# Patient Record
Sex: FEMALE | Race: WHITE | NOT HISPANIC OR LATINO | Employment: UNEMPLOYED | URBAN - METROPOLITAN AREA
[De-identification: names, ages, dates, MRNs, and addresses within clinical notes are randomized per-mention and may not be internally consistent; named-entity substitution may affect disease eponyms.]

---

## 2020-11-20 ENCOUNTER — HOSPITAL ENCOUNTER (EMERGENCY)
Facility: HOSPITAL | Age: 55
Discharge: HOME | End: 2020-11-20
Attending: EMERGENCY MEDICINE
Payer: COMMERCIAL

## 2020-11-20 VITALS
RESPIRATION RATE: 15 BRPM | SYSTOLIC BLOOD PRESSURE: 121 MMHG | TEMPERATURE: 99 F | OXYGEN SATURATION: 92 % | WEIGHT: 293 LBS | HEART RATE: 83 BPM | DIASTOLIC BLOOD PRESSURE: 82 MMHG | BODY MASS INDEX: 56.38 KG/M2

## 2020-11-20 DIAGNOSIS — Z20.822 SUSPECTED COVID-19 VIRUS INFECTION: ICD-10-CM

## 2020-11-20 DIAGNOSIS — J81.0 ACUTE PULMONARY EDEMA: Primary | ICD-10-CM

## 2020-11-20 DIAGNOSIS — R06.02 SOB (SHORTNESS OF BREATH): ICD-10-CM

## 2020-11-20 LAB
ALBUMIN SERPL BCP-MCNC: 3.5 G/DL (ref 3.5–5.2)
ALP SERPL-CCNC: 90 U/L (ref 55–135)
ALT SERPL W/O P-5'-P-CCNC: 32 U/L (ref 10–44)
ANION GAP SERPL CALC-SCNC: 12 MMOL/L (ref 8–16)
AST SERPL-CCNC: 27 U/L (ref 10–40)
BASOPHILS # BLD AUTO: 0.1 K/UL (ref 0–0.2)
BASOPHILS NFR BLD: 0.9 % (ref 0–1.9)
BILIRUB SERPL-MCNC: 0.3 MG/DL (ref 0.1–1)
BILIRUB UR QL STRIP: NEGATIVE
BNP SERPL-MCNC: 34 PG/ML (ref 0–99)
BUN SERPL-MCNC: 7 MG/DL (ref 6–20)
CALCIUM SERPL-MCNC: 9.3 MG/DL (ref 8.7–10.5)
CHLORIDE SERPL-SCNC: 98 MMOL/L (ref 95–110)
CK SERPL-CCNC: 65 U/L (ref 20–180)
CK SERPL-CCNC: 65 U/L (ref 20–180)
CLARITY UR REFRACT.AUTO: CLEAR
CO2 SERPL-SCNC: 27 MMOL/L (ref 23–29)
COLOR UR AUTO: YELLOW
CREAT SERPL-MCNC: 0.7 MG/DL (ref 0.5–1.4)
CRP SERPL-MCNC: 26.5 MG/L (ref 0–8.2)
CTP QC/QA: YES
CTP QC/QA: YES
D DIMER PPP IA.FEU-MCNC: 0.71 MG/L FEU
DIFFERENTIAL METHOD: ABNORMAL
EOSINOPHIL # BLD AUTO: 0.2 K/UL (ref 0–0.5)
EOSINOPHIL NFR BLD: 1.6 % (ref 0–8)
ERYTHROCYTE [DISTWIDTH] IN BLOOD BY AUTOMATED COUNT: 14.9 % (ref 11.5–14.5)
EST. GFR  (AFRICAN AMERICAN): >60 ML/MIN/1.73 M^2
EST. GFR  (NON AFRICAN AMERICAN): >60 ML/MIN/1.73 M^2
GLUCOSE SERPL-MCNC: 104 MG/DL (ref 70–110)
GLUCOSE UR QL STRIP: NEGATIVE
HCT VFR BLD AUTO: 42.5 % (ref 37–48.5)
HGB BLD-MCNC: 13.4 G/DL (ref 12–16)
HGB UR QL STRIP: NEGATIVE
IMM GRANULOCYTES # BLD AUTO: 0.06 K/UL (ref 0–0.04)
IMM GRANULOCYTES NFR BLD AUTO: 0.6 % (ref 0–0.5)
KETONES UR QL STRIP: NEGATIVE
LACTATE SERPL-SCNC: 1 MMOL/L (ref 0.5–2.2)
LDH SERPL L TO P-CCNC: 201 U/L (ref 110–260)
LEUKOCYTE ESTERASE UR QL STRIP: NEGATIVE
LYMPHOCYTES # BLD AUTO: 2.8 K/UL (ref 1–4.8)
LYMPHOCYTES NFR BLD: 26 % (ref 18–48)
MCH RBC QN AUTO: 27.7 PG (ref 27–31)
MCHC RBC AUTO-ENTMCNC: 31.5 G/DL (ref 32–36)
MCV RBC AUTO: 88 FL (ref 82–98)
MONOCYTES # BLD AUTO: 0.7 K/UL (ref 0.3–1)
MONOCYTES NFR BLD: 6.6 % (ref 4–15)
NEUTROPHILS # BLD AUTO: 7 K/UL (ref 1.8–7.7)
NEUTROPHILS NFR BLD: 64.3 % (ref 38–73)
NITRITE UR QL STRIP: NEGATIVE
NRBC BLD-RTO: 0 /100 WBC
PH UR STRIP: 8 [PH] (ref 5–8)
PLATELET # BLD AUTO: 244 K/UL (ref 150–350)
PMV BLD AUTO: 9.9 FL (ref 9.2–12.9)
POTASSIUM SERPL-SCNC: 3.8 MMOL/L (ref 3.5–5.1)
PROT SERPL-MCNC: 7.7 G/DL (ref 6–8.4)
PROT UR QL STRIP: NEGATIVE
RBC # BLD AUTO: 4.84 M/UL (ref 4–5.4)
SARS-COV-2 RDRP RESP QL NAA+PROBE: NEGATIVE
SARS-COV-2 RDRP RESP QL NAA+PROBE: NEGATIVE
SODIUM SERPL-SCNC: 137 MMOL/L (ref 136–145)
SP GR UR STRIP: 1.01 (ref 1–1.03)
TROPONIN I SERPL DL<=0.01 NG/ML-MCNC: 0.02 NG/ML (ref 0–0.03)
URN SPEC COLLECT METH UR: NORMAL
UROBILINOGEN UR STRIP-ACNC: NEGATIVE EU/DL
WBC # BLD AUTO: 10.84 K/UL (ref 3.9–12.7)

## 2020-11-20 PROCEDURE — 96374 THER/PROPH/DIAG INJ IV PUSH: CPT | Mod: 59,ER

## 2020-11-20 PROCEDURE — 84484 ASSAY OF TROPONIN QUANT: CPT | Mod: ER

## 2020-11-20 PROCEDURE — U0002 COVID-19 LAB TEST NON-CDC: HCPCS | Mod: ER | Performed by: EMERGENCY MEDICINE

## 2020-11-20 PROCEDURE — 83615 LACTATE (LD) (LDH) ENZYME: CPT | Mod: ER

## 2020-11-20 PROCEDURE — 85025 COMPLETE CBC W/AUTO DIFF WBC: CPT | Mod: ER

## 2020-11-20 PROCEDURE — 85379 FIBRIN DEGRADATION QUANT: CPT | Mod: ER

## 2020-11-20 PROCEDURE — 82728 ASSAY OF FERRITIN: CPT

## 2020-11-20 PROCEDURE — 93010 ELECTROCARDIOGRAM REPORT: CPT | Mod: ,,, | Performed by: INTERNAL MEDICINE

## 2020-11-20 PROCEDURE — 86703 HIV-1/HIV-2 1 RESULT ANTBDY: CPT

## 2020-11-20 PROCEDURE — 81003 URINALYSIS AUTO W/O SCOPE: CPT | Mod: ER

## 2020-11-20 PROCEDURE — 25500020 PHARM REV CODE 255: Mod: ER | Performed by: EMERGENCY MEDICINE

## 2020-11-20 PROCEDURE — 86140 C-REACTIVE PROTEIN: CPT | Mod: ER

## 2020-11-20 PROCEDURE — 83880 ASSAY OF NATRIURETIC PEPTIDE: CPT | Mod: ER

## 2020-11-20 PROCEDURE — 80053 COMPREHEN METABOLIC PANEL: CPT | Mod: ER

## 2020-11-20 PROCEDURE — 93010 EKG 12-LEAD: ICD-10-PCS | Mod: ,,, | Performed by: INTERNAL MEDICINE

## 2020-11-20 PROCEDURE — 99285 EMERGENCY DEPT VISIT HI MDM: CPT | Mod: 25,ER

## 2020-11-20 PROCEDURE — 86803 HEPATITIS C AB TEST: CPT

## 2020-11-20 PROCEDURE — 83605 ASSAY OF LACTIC ACID: CPT | Mod: ER

## 2020-11-20 PROCEDURE — 93005 ELECTROCARDIOGRAM TRACING: CPT | Mod: ER

## 2020-11-20 PROCEDURE — 87040 BLOOD CULTURE FOR BACTERIA: CPT | Mod: 59

## 2020-11-20 PROCEDURE — 63600175 PHARM REV CODE 636 W HCPCS: Mod: ER | Performed by: NURSE PRACTITIONER

## 2020-11-20 PROCEDURE — 82550 ASSAY OF CK (CPK): CPT | Mod: ER

## 2020-11-20 RX ORDER — FUROSEMIDE 10 MG/ML
20 INJECTION INTRAMUSCULAR; INTRAVENOUS
Status: COMPLETED | OUTPATIENT
Start: 2020-11-20 | End: 2020-11-20

## 2020-11-20 RX ORDER — OXCARBAZEPINE 150 MG/1
150 TABLET, FILM COATED ORAL 2 TIMES DAILY
Status: ON HOLD | COMMUNITY
End: 2021-11-28

## 2020-11-20 RX ORDER — FUROSEMIDE 20 MG/1
20 TABLET ORAL DAILY
Qty: 15 TABLET | Refills: 0 | Status: ON HOLD | OUTPATIENT
Start: 2020-11-20 | End: 2021-11-29 | Stop reason: SDUPTHER

## 2020-11-20 RX ADMIN — FUROSEMIDE 20 MG: 10 INJECTION, SOLUTION INTRAMUSCULAR; INTRAVENOUS at 07:11

## 2020-11-20 RX ADMIN — IOHEXOL 100 ML: 350 INJECTION, SOLUTION INTRAVENOUS at 09:11

## 2020-11-20 NOTE — ED NOTES
"Pt refuses a nasal swab for covid testing. "No, I heard it hurts and I'm not doing it, my grown ass son had to be held down and I'm not doing it."  "

## 2020-11-21 LAB
FERRITIN SERPL-MCNC: 75 NG/ML (ref 20–300)
HCV AB SERPL QL IA: NEGATIVE
HIV 1+2 AB+HIV1 P24 AG SERPL QL IA: NEGATIVE

## 2020-11-21 NOTE — ED NOTES
Patient given copy of discharge instructions and prescriptions. New medications were explained to patient. Patient verbalizes understanding. Stressed the importance of f/u appointment and when to return for worsening symptoms.  No distress noted.

## 2020-11-21 NOTE — ED PROVIDER NOTES
Encounter Date: 11/20/2020       History     Chief Complaint   Patient presents with    Shortness of Breath     Pt was at a pre-op appointment. pt was sent here due to high blood pressure, and o2 sat in the low 90s. pt mildly tachypneic when she walks     The history is provided by the patient.   General Illness   The current episode started just prior to arrival (was being evaluated for surgey and BP was hiigh ,oxugen level low ). The problem occurs rarely (c/o SOB ). The problem has been unchanged. The pain is at a severity of 0/10. Nothing relieves the symptoms. Nothing aggravates the symptoms. Associated symptoms include shortness of breath. Pertinent negatives include no fever, no decreased vision, no double vision, no eye itching, no photophobia, no abdominal pain, no constipation, no diarrhea, no nausea, no vomiting, no congestion, no ear discharge, no ear pain, no headaches, no hearing loss, no mouth sores, no rhinorrhea, no sore throat, no stridor, no swollen glands, no muscle aches, no neck pain, no neck stiffness, no cough, no URI, no wheezing, no rash, no diaper rash, no discharge, no pain and no eye redness. Services received include tests performed. Recently, medical care has been given by the PCP.     Review of patient's allergies indicates:  No Known Allergies  No past medical history on file.  No past surgical history on file.  No family history on file.  Social History     Tobacco Use    Smoking status: Current Every Day Smoker     Packs/day: 1.00   Substance Use Topics    Alcohol use: No    Drug use: No     Review of Systems   Constitutional: Negative for fever.   HENT: Negative for congestion, ear discharge, ear pain, hearing loss, mouth sores, rhinorrhea and sore throat.    Eyes: Negative for double vision, photophobia, pain, discharge, redness and itching.   Respiratory: Positive for shortness of breath. Negative for cough, wheezing and stridor.    Cardiovascular: Negative for chest pain.    Gastrointestinal: Negative for abdominal pain, constipation, diarrhea, nausea and vomiting.   Genitourinary: Negative for dysuria.   Musculoskeletal: Negative for back pain and neck pain.   Skin: Negative for rash.   Neurological: Negative for weakness and headaches.   Hematological: Does not bruise/bleed easily.   All other systems reviewed and are negative.      Physical Exam     Initial Vitals [11/20/20 1618]   BP Pulse Resp Temp SpO2   (!) 192/102 95 (!) 24 98.8 °F (37.1 °C) (!) 90 %      MAP       --         Physical Exam    Nursing note and vitals reviewed.  Constitutional: She appears well-developed and well-nourished.   HENT:   Head: Normocephalic and atraumatic.   Mouth/Throat: No oropharyngeal exudate.   Eyes: Conjunctivae, EOM and lids are normal. Pupils are equal, round, and reactive to light. Lids are everted and swept, no foreign bodies found.   Neck: Trachea normal, normal range of motion, full passive range of motion without pain and phonation normal. Neck supple.   Cardiovascular: Normal rate, regular rhythm, S1 normal, S2 normal, normal heart sounds, intact distal pulses and normal pulses.   Pulmonary/Chest: Effort normal and breath sounds normal. No respiratory distress.   Abdominal: Soft. Bowel sounds are normal.   Lymphadenopathy:     She has no cervical adenopathy.     She has no axillary adenopathy.   Neurological: She is alert and oriented to person, place, and time. She has normal strength and normal reflexes. No cranial nerve deficit or sensory deficit. She displays a negative Romberg sign.   Skin: Skin is warm and dry. Capillary refill takes less than 2 seconds.         ED Course   Procedures  Labs Reviewed   CBC W/ AUTO DIFFERENTIAL - Abnormal; Notable for the following components:       Result Value    MCHC 31.5 (*)     RDW 14.9 (*)     Immature Granulocytes 0.6 (*)     Immature Grans (Abs) 0.06 (*)     All other components within normal limits   C-REACTIVE PROTEIN - Abnormal; Notable  for the following components:    CRP 26.5 (*)     All other components within normal limits   D DIMER, QUANTITATIVE - Abnormal; Notable for the following components:    D-Dimer 0.71 (*)     All other components within normal limits   CULTURE, BLOOD   CULTURE, BLOOD   COMPREHENSIVE METABOLIC PANEL   TROPONIN I   B-TYPE NATRIURETIC PEPTIDE   LACTIC ACID, PLASMA   LACTATE DEHYDROGENASE   CK   CK   URINALYSIS, REFLEX TO URINE CULTURE    Narrative:     Specimen Source->Urine   B-TYPE NATRIURETIC PEPTIDE   HIV 1 / 2 ANTIBODY   HEPATITIS C ANTIBODY   FERRITIN   SARS-COV-2 RDRP GENE   SARS-COV-2 RDRP GENE          Imaging Results          X-Ray Chest 1 View (Final result)  Result time 11/20/20 17:33:51    Final result by Ryan Russo MD (11/20/20 17:33:51)                 Impression:      Findings borderline for mild pulmonary edema.      Electronically signed by: Ryan Russo  Date:    11/20/2020  Time:    17:33             Narrative:    EXAMINATION:  XR CHEST 1 VIEW    CLINICAL HISTORY:  Shortness of breath    TECHNIQUE:  Single frontal view of the chest was performed.    COMPARISON:  None    FINDINGS:  Borderline increased attenuation lung bases possibly relating to pulmonary edema.    The cardiac silhouette is normal in size. The hilar and mediastinal contours are unremarkable.    Bones are intact.                                        9:00 pm care of fifi turned over to Dr. Spencer                     Clinical Impression:       ICD-10-CM ICD-9-CM   1. SOB (shortness of breath)  R06.02 786.05   2. Suspected COVID-19 virus infection  Z20.828 V01.79                                               Dennis Sparks NP  11/22/20 4575

## 2020-11-21 NOTE — DISCHARGE INSTRUCTIONS
Return if  breathing worsens  Follow up with Primary Care Physician Monday to further evaluate your condition

## 2020-11-26 LAB
BACTERIA BLD CULT: NORMAL
BACTERIA BLD CULT: NORMAL

## 2021-01-04 NOTE — ED PROVIDER NOTES
Encounter Date: 11/20/2020       History     Chief Complaint   Patient presents with    Shortness of Breath     Pt was at a pre-op appointment. pt was sent here due to high blood pressure, and o2 sat in the low 90s. pt mildly tachypneic when she walks     HPI  Review of patient's allergies indicates:  No Known Allergies  No past medical history on file.  No past surgical history on file.  No family history on file.  Social History     Tobacco Use    Smoking status: Current Every Day Smoker     Packs/day: 1.00   Substance Use Topics    Alcohol use: No    Drug use: No     Review of Systems    Physical Exam     Initial Vitals [11/20/20 1618]   BP Pulse Resp Temp SpO2   (!) 192/102 95 (!) 24 98.8 °F (37.1 °C) (!) 90 %      MAP       --         Physical Exam    ED Course   Procedures  Labs Reviewed   CBC W/ AUTO DIFFERENTIAL - Abnormal; Notable for the following components:       Result Value    MCHC 31.5 (*)     RDW 14.9 (*)     Immature Granulocytes 0.6 (*)     Immature Grans (Abs) 0.06 (*)     All other components within normal limits   C-REACTIVE PROTEIN - Abnormal; Notable for the following components:    CRP 26.5 (*)     All other components within normal limits   D DIMER, QUANTITATIVE - Abnormal; Notable for the following components:    D-Dimer 0.71 (*)     All other components within normal limits   CULTURE, BLOOD   CULTURE, BLOOD   HIV 1 / 2 ANTIBODY   HEPATITIS C ANTIBODY   COMPREHENSIVE METABOLIC PANEL   TROPONIN I   B-TYPE NATRIURETIC PEPTIDE   LACTIC ACID, PLASMA   FERRITIN   LACTATE DEHYDROGENASE   CK   CK   URINALYSIS, REFLEX TO URINE CULTURE    Narrative:     Specimen Source->Urine   B-TYPE NATRIURETIC PEPTIDE   SARS-COV-2 RDRP GENE   SARS-COV-2 RDRP GENE        ECG Results          EKG 12-lead (Final result)  Result time 11/22/20 19:42:25    Final result by Interface, Lab [facility] (11/22/20 19:42:25)                 Narrative:    Test Reason : Z20.828,    Vent. Rate : 103 BPM     Atrial Rate : 103  BPM     P-R Int : 128 ms          QRS Dur : 090 ms      QT Int : 342 ms       P-R-T Axes : 063 069 019 degrees     QTc Int : 448 ms    Sinus tachycardia  Otherwise normal ECG  No previous ECGs available  Confirmed by ALBERT KAMINSKI MD ([id number]) on 11/22/2020 7:41:54 PM    Referred By: [email or web address]   SELF           Confirmed By:ALBERT KAMINSKI MD                             EKG 12-LEAD (Final result)  Result time 12/29/20 11:18:38    Final result by Unknown User (12/29/20 11:18:38)                                Imaging Results          CTA Chest Non-Coronary (PE Study) (Final result)  Result time 11/20/20 21:27:04    Final result by Ryan Russo MD (11/20/20 21:27:04)                 Impression:      No pulmonary thromboembolism.    Slight increased interstitial attenuation in the dependent lung bases, nonspecific.  A component of mild pulmonary volume overload cannot be excluded.    All CT scans at this facility are performed  using dose modulation techniques as appropriate to performed exam including the following:  automated exposure control; adjustment of mA and/or kV according to the patients size (this includes techniques or standardized protocols for targeted exams where dose is matched to indication/reason for exam: i.e. extremities or head);  iterative reconstruction technique.      Electronically signed by: Ryan Russo  Date:    11/20/2020  Time:    21:27             Narrative:    EXAMINATION:  CTA CHEST NON CORONARY    CLINICAL HISTORY:  PE suspected, intermediate prob, positive D-dimer;    TECHNIQUE:  Low dose axial images, sagittal and coronal reformations were obtained from the thoracic inlet to the lung bases following the IV administration of 100 mL of Omnipaque 350.  Contrast timing was optimized to evaluate the pulmonary arteries.  MIP images were performed.    COMPARISON:  Chest radiograph 11/20/2020.    FINDINGS:  Base of Neck: No significant abnormality.    Thoracic soft tissues:  Unremarkable.    Aorta: Left-sided aortic arch.  No aneurysm and no significant atherosclerosis    Heart: Normal size. No effusion.    Pulmonary vasculature: Pulmonary arteries are well opacified.  There is no pulmonary thromboembolism.    Neda/Mediastinum: No pathologic leandra enlargement.    Airways: Patent.    Lungs/Pleura: Slight increased interstitial attenuation in the dependent lung bases, nonspecific.  A component of mild pulmonary volume overload cannot be excluded.  Otherwise the lungs are clear.    Esophagus: Unremarkable.    Upper Abdomen: Cholelithiasis.    Bones: No acute fracture. No suspicious lytic or sclerotic lesions.                               X-Ray Chest 1 View (Final result)  Result time 11/20/20 17:33:51    Final result by Ryan Russo MD (11/20/20 17:33:51)                 Impression:      Findings borderline for mild pulmonary edema.      Electronically signed by: Ryan Russo  Date:    11/20/2020  Time:    17:33             Narrative:    EXAMINATION:  XR CHEST 1 VIEW    CLINICAL HISTORY:  Shortness of breath    TECHNIQUE:  Single frontal view of the chest was performed.    COMPARISON:  None    FINDINGS:  Borderline increased attenuation lung bases possibly relating to pulmonary edema.    The cardiac silhouette is normal in size. The hilar and mediastinal contours are unremarkable.    Bones are intact.                                              Attending Attestation:     Physician Attestation Statement for NP/PA:   I have conducted a face to face encounter with this patient in addition to the NP/PA, due to NP/PA Request    Other NP/PA Attestation Additions:    History of Present Illness: 56 y/o female with c/o SOB with activity. Was undergoing evaluation for surgery and noted to be tachypneic and sent to ED. Sats were also low.   Physical Exam: Pt in minimal distress with normal RR at time of my exam. VSS. Afebrile  No use of accessory respiratory muscles.    Lungs: Scattered  rhonchi with diminished basilar BS  Heart : RRR   Extremities with no cyanosis     Medical Decision Making: Patient turned over essentially gto review CT scan as PE was being ruled out.  The CT revealed no evidence of PE and patient was stable enough for discharge secondary to COPD and P. Edema that is currently stable   Procedure Note: 0                          Clinical Impression:       ICD-10-CM ICD-9-CM   1. Acute pulmonary edema  J81.0 518.4   2. SOB (shortness of breath)  R06.02 786.05   3. Suspected COVID-19 virus infection  Z20.828 V01.79                          ED Disposition Condition    Discharge Stable        ED Prescriptions     Medication Sig Dispense Start Date End Date Auth. Provider    furosemide (LASIX) 20 MG tablet Take 1 tablet (20 mg total) by mouth once daily. For fluid 15 tablet 11/20/2020  Simone Spencer MD        Follow-up Information     Follow up With Specialties Details Why Contact Info    [facility] Emergency Medicine  If symptoms worsen [address]  [facility] [id number] [phone]                                       Simone Spencer MD  01/04/21 0816

## 2021-11-27 ENCOUNTER — HOSPITAL ENCOUNTER (INPATIENT)
Facility: HOSPITAL | Age: 56
LOS: 2 days | Discharge: HOME HEALTH | DRG: 189 | End: 2021-11-29
Attending: EMERGENCY MEDICINE | Admitting: INTERNAL MEDICINE
Payer: COMMERCIAL

## 2021-11-27 DIAGNOSIS — J96.21 ACUTE ON CHRONIC RESPIRATORY FAILURE WITH HYPOXIA AND HYPERCAPNIA: ICD-10-CM

## 2021-11-27 DIAGNOSIS — E66.01 CLASS 3 SEVERE OBESITY DUE TO EXCESS CALORIES WITHOUT SERIOUS COMORBIDITY WITH BODY MASS INDEX (BMI) OF 60.0 TO 69.9 IN ADULT: Chronic | ICD-10-CM

## 2021-11-27 DIAGNOSIS — J96.01 ACUTE RESPIRATORY FAILURE WITH HYPOXIA AND HYPERCAPNIA: Primary | ICD-10-CM

## 2021-11-27 DIAGNOSIS — J96.22 ACUTE ON CHRONIC RESPIRATORY FAILURE WITH HYPOXIA AND HYPERCAPNIA: ICD-10-CM

## 2021-11-27 DIAGNOSIS — R06.02 SOB (SHORTNESS OF BREATH): ICD-10-CM

## 2021-11-27 DIAGNOSIS — J96.02 ACUTE RESPIRATORY FAILURE WITH HYPOXIA AND HYPERCAPNIA: Primary | ICD-10-CM

## 2021-11-27 LAB
ALBUMIN SERPL BCP-MCNC: 3.5 G/DL (ref 3.5–5.2)
ALLENS TEST: ABNORMAL
ALP SERPL-CCNC: 69 U/L (ref 55–135)
ALT SERPL W/O P-5'-P-CCNC: 48 U/L (ref 10–44)
ANION GAP SERPL CALC-SCNC: 7 MMOL/L (ref 8–16)
AST SERPL-CCNC: 28 U/L (ref 10–40)
BASOPHILS # BLD AUTO: 0.06 K/UL (ref 0–0.2)
BASOPHILS NFR BLD: 0.9 % (ref 0–1.9)
BILIRUB SERPL-MCNC: 0.2 MG/DL (ref 0.1–1)
BNP SERPL-MCNC: 136 PG/ML (ref 0–99)
BUN SERPL-MCNC: 10 MG/DL (ref 6–20)
CALCIUM SERPL-MCNC: 9 MG/DL (ref 8.7–10.5)
CHLORIDE SERPL-SCNC: 101 MMOL/L (ref 95–110)
CK SERPL-CCNC: 169 U/L (ref 20–180)
CO2 SERPL-SCNC: 33 MMOL/L (ref 23–29)
CREAT SERPL-MCNC: 0.8 MG/DL (ref 0.5–1.4)
CTP QC/QA: YES
CTP QC/QA: YES
DELSYS: ABNORMAL
DIFFERENTIAL METHOD: ABNORMAL
EOSINOPHIL # BLD AUTO: 0.1 K/UL (ref 0–0.5)
EOSINOPHIL NFR BLD: 0.8 % (ref 0–8)
ERYTHROCYTE [DISTWIDTH] IN BLOOD BY AUTOMATED COUNT: 15.9 % (ref 11.5–14.5)
EST. GFR  (AFRICAN AMERICAN): >60 ML/MIN/1.73 M^2
EST. GFR  (NON AFRICAN AMERICAN): >60 ML/MIN/1.73 M^2
FIO2: 28
GLUCOSE SERPL-MCNC: 139 MG/DL (ref 70–110)
HCO3 UR-SCNC: 37.3 MMOL/L (ref 24–28)
HCT VFR BLD AUTO: 39.2 % (ref 37–48.5)
HGB BLD-MCNC: 11.4 G/DL (ref 12–16)
IMM GRANULOCYTES # BLD AUTO: 0.09 K/UL (ref 0–0.04)
IMM GRANULOCYTES NFR BLD AUTO: 1.4 % (ref 0–0.5)
LYMPHOCYTES # BLD AUTO: 1.5 K/UL (ref 1–4.8)
LYMPHOCYTES NFR BLD: 22.2 % (ref 18–48)
MCH RBC QN AUTO: 27.1 PG (ref 27–31)
MCHC RBC AUTO-ENTMCNC: 29.1 G/DL (ref 32–36)
MCV RBC AUTO: 93 FL (ref 82–98)
MODE: ABNORMAL
MONOCYTES # BLD AUTO: 0.6 K/UL (ref 0.3–1)
MONOCYTES NFR BLD: 8.5 % (ref 4–15)
NEUTROPHILS # BLD AUTO: 4.4 K/UL (ref 1.8–7.7)
NEUTROPHILS NFR BLD: 66.2 % (ref 38–73)
NRBC BLD-RTO: 0 /100 WBC
PCO2 BLDA: 85.3 MMHG (ref 35–45)
PH SMN: 7.25 [PH] (ref 7.35–7.45)
PLATELET # BLD AUTO: 183 K/UL (ref 150–450)
PMV BLD AUTO: 10.2 FL (ref 9.2–12.9)
PO2 BLDA: 51 MMHG (ref 80–100)
POC BE: 10 MMOL/L
POC MOLECULAR INFLUENZA A AGN: NEGATIVE
POC MOLECULAR INFLUENZA B AGN: NEGATIVE
POC SATURATED O2: 77 % (ref 95–100)
POTASSIUM SERPL-SCNC: 4.4 MMOL/L (ref 3.5–5.1)
PROT SERPL-MCNC: 7.4 G/DL (ref 6–8.4)
RBC # BLD AUTO: 4.2 M/UL (ref 4–5.4)
SAMPLE: ABNORMAL
SARS-COV-2 RDRP RESP QL NAA+PROBE: NEGATIVE
SITE: ABNORMAL
SODIUM SERPL-SCNC: 141 MMOL/L (ref 136–145)
TROPONIN I SERPL DL<=0.01 NG/ML-MCNC: <0.006 NG/ML (ref 0–0.03)
WBC # BLD AUTO: 6.58 K/UL (ref 3.9–12.7)

## 2021-11-27 PROCEDURE — A4216 STERILE WATER/SALINE, 10 ML: HCPCS | Performed by: INTERNAL MEDICINE

## 2021-11-27 PROCEDURE — 94660 CPAP INITIATION&MGMT: CPT | Mod: ER

## 2021-11-27 PROCEDURE — 99900035 HC TECH TIME PER 15 MIN (STAT): Mod: ER

## 2021-11-27 PROCEDURE — 36600 WITHDRAWAL OF ARTERIAL BLOOD: CPT | Mod: ER

## 2021-11-27 PROCEDURE — 27000190 HC CPAP FULL FACE MASK W/VALVE: Mod: ER

## 2021-11-27 PROCEDURE — 82803 BLOOD GASES ANY COMBINATION: CPT | Mod: ER

## 2021-11-27 PROCEDURE — 63600175 PHARM REV CODE 636 W HCPCS: Performed by: INTERNAL MEDICINE

## 2021-11-27 PROCEDURE — 20000000 HC ICU ROOM

## 2021-11-27 PROCEDURE — 93010 EKG 12-LEAD: ICD-10-PCS | Mod: ,,, | Performed by: INTERNAL MEDICINE

## 2021-11-27 PROCEDURE — 80053 COMPREHEN METABOLIC PANEL: CPT | Mod: ER | Performed by: EMERGENCY MEDICINE

## 2021-11-27 PROCEDURE — 82550 ASSAY OF CK (CPK): CPT | Mod: ER | Performed by: EMERGENCY MEDICINE

## 2021-11-27 PROCEDURE — 93005 ELECTROCARDIOGRAM TRACING: CPT | Mod: ER

## 2021-11-27 PROCEDURE — 85025 COMPLETE CBC W/AUTO DIFF WBC: CPT | Mod: ER | Performed by: EMERGENCY MEDICINE

## 2021-11-27 PROCEDURE — 93010 ELECTROCARDIOGRAM REPORT: CPT | Mod: ,,, | Performed by: INTERNAL MEDICINE

## 2021-11-27 PROCEDURE — 83880 ASSAY OF NATRIURETIC PEPTIDE: CPT | Mod: ER | Performed by: EMERGENCY MEDICINE

## 2021-11-27 PROCEDURE — U0002 COVID-19 LAB TEST NON-CDC: HCPCS | Mod: ER | Performed by: EMERGENCY MEDICINE

## 2021-11-27 PROCEDURE — 84484 ASSAY OF TROPONIN QUANT: CPT | Mod: ER | Performed by: EMERGENCY MEDICINE

## 2021-11-27 PROCEDURE — 25000003 PHARM REV CODE 250: Performed by: INTERNAL MEDICINE

## 2021-11-27 PROCEDURE — 99285 EMERGENCY DEPT VISIT HI MDM: CPT | Mod: 25,ER

## 2021-11-27 PROCEDURE — 99900035 HC TECH TIME PER 15 MIN (STAT)

## 2021-11-27 RX ORDER — LANOLIN ALCOHOL/MO/W.PET/CERES
800 CREAM (GRAM) TOPICAL
Status: DISCONTINUED | OUTPATIENT
Start: 2021-11-27 | End: 2021-11-29 | Stop reason: HOSPADM

## 2021-11-27 RX ORDER — NALOXONE HCL 0.4 MG/ML
0.02 VIAL (ML) INJECTION
Status: DISCONTINUED | OUTPATIENT
Start: 2021-11-27 | End: 2021-11-29 | Stop reason: HOSPADM

## 2021-11-27 RX ORDER — SODIUM CHLORIDE 0.9 % (FLUSH) 0.9 %
10 SYRINGE (ML) INJECTION EVERY 8 HOURS
Status: DISCONTINUED | OUTPATIENT
Start: 2021-11-27 | End: 2021-11-29 | Stop reason: HOSPADM

## 2021-11-27 RX ORDER — GLUCAGON 1 MG
1 KIT INJECTION
Status: DISCONTINUED | OUTPATIENT
Start: 2021-11-27 | End: 2021-11-29 | Stop reason: HOSPADM

## 2021-11-27 RX ORDER — METHYLPREDNISOLONE SOD SUCC 125 MG
80 VIAL (EA) INJECTION EVERY 6 HOURS
Status: DISCONTINUED | OUTPATIENT
Start: 2021-11-28 | End: 2021-11-28

## 2021-11-27 RX ORDER — SODIUM,POTASSIUM PHOSPHATES 280-250MG
2 POWDER IN PACKET (EA) ORAL
Status: DISCONTINUED | OUTPATIENT
Start: 2021-11-27 | End: 2021-11-29 | Stop reason: HOSPADM

## 2021-11-27 RX ORDER — BUDESONIDE 0.5 MG/2ML
0.5 INHALANT ORAL EVERY 12 HOURS
Status: DISCONTINUED | OUTPATIENT
Start: 2021-11-28 | End: 2021-11-29 | Stop reason: HOSPADM

## 2021-11-27 RX ORDER — LOSARTAN POTASSIUM 50 MG/1
50 TABLET ORAL
COMMUNITY
Start: 2021-04-27

## 2021-11-27 RX ORDER — TALC
6 POWDER (GRAM) TOPICAL NIGHTLY PRN
Status: DISCONTINUED | OUTPATIENT
Start: 2021-11-27 | End: 2021-11-29 | Stop reason: HOSPADM

## 2021-11-27 RX ORDER — ACETAMINOPHEN 325 MG/1
650 TABLET ORAL EVERY 4 HOURS PRN
Status: DISCONTINUED | OUTPATIENT
Start: 2021-11-27 | End: 2021-11-29 | Stop reason: HOSPADM

## 2021-11-27 RX ORDER — LOSARTAN POTASSIUM 50 MG/1
50 TABLET ORAL DAILY
Status: DISCONTINUED | OUTPATIENT
Start: 2021-11-28 | End: 2021-11-29 | Stop reason: HOSPADM

## 2021-11-27 RX ORDER — HYDROCODONE BITARTRATE AND ACETAMINOPHEN 5; 325 MG/1; MG/1
1 TABLET ORAL EVERY 6 HOURS PRN
Status: DISCONTINUED | OUTPATIENT
Start: 2021-11-27 | End: 2021-11-29 | Stop reason: HOSPADM

## 2021-11-27 RX ORDER — IBUPROFEN 200 MG
16 TABLET ORAL
Status: DISCONTINUED | OUTPATIENT
Start: 2021-11-27 | End: 2021-11-29 | Stop reason: HOSPADM

## 2021-11-27 RX ORDER — FUROSEMIDE 20 MG/1
20 TABLET ORAL DAILY
Status: DISCONTINUED | OUTPATIENT
Start: 2021-11-28 | End: 2021-11-28

## 2021-11-27 RX ORDER — POLYETHYLENE GLYCOL 3350 17 G/17G
17 POWDER, FOR SOLUTION ORAL DAILY PRN
Status: DISCONTINUED | OUTPATIENT
Start: 2021-11-27 | End: 2021-11-29 | Stop reason: HOSPADM

## 2021-11-27 RX ORDER — ATORVASTATIN CALCIUM 40 MG/1
TABLET, FILM COATED ORAL
COMMUNITY
Start: 2021-05-02 | End: 2023-09-22

## 2021-11-27 RX ORDER — LOSARTAN POTASSIUM 25 MG/1
25 TABLET ORAL
COMMUNITY
Start: 2021-04-09 | End: 2021-11-27 | Stop reason: SDUPTHER

## 2021-11-27 RX ORDER — OXCARBAZEPINE 150 MG/1
150 TABLET, FILM COATED ORAL 2 TIMES DAILY
Status: DISCONTINUED | OUTPATIENT
Start: 2021-11-27 | End: 2021-11-28

## 2021-11-27 RX ORDER — IBUPROFEN 200 MG
24 TABLET ORAL
Status: DISCONTINUED | OUTPATIENT
Start: 2021-11-27 | End: 2021-11-29 | Stop reason: HOSPADM

## 2021-11-27 RX ORDER — CLONAZEPAM 1 MG/1
1 TABLET ORAL
COMMUNITY
Start: 2021-05-25

## 2021-11-27 RX ORDER — IPRATROPIUM BROMIDE AND ALBUTEROL SULFATE 2.5; .5 MG/3ML; MG/3ML
3 SOLUTION RESPIRATORY (INHALATION) EVERY 6 HOURS
Status: DISCONTINUED | OUTPATIENT
Start: 2021-11-28 | End: 2021-11-28

## 2021-11-27 RX ORDER — ARFORMOTEROL TARTRATE 15 UG/2ML
15 SOLUTION RESPIRATORY (INHALATION) 2 TIMES DAILY
Status: DISCONTINUED | OUTPATIENT
Start: 2021-11-28 | End: 2021-11-29 | Stop reason: HOSPADM

## 2021-11-27 RX ADMIN — AZITHROMYCIN MONOHYDRATE 500 MG: 500 INJECTION, POWDER, LYOPHILIZED, FOR SOLUTION INTRAVENOUS at 11:11

## 2021-11-27 RX ADMIN — Medication 10 ML: at 10:11

## 2021-11-27 RX ADMIN — METHYLPREDNISOLONE SODIUM SUCCINATE 80 MG: 125 INJECTION, POWDER, FOR SOLUTION INTRAMUSCULAR; INTRAVENOUS at 11:11

## 2021-11-27 RX ADMIN — APIXABAN 5 MG: 2.5 TABLET, FILM COATED ORAL at 11:11

## 2021-11-27 RX ADMIN — OXCARBAZEPINE 150 MG: 150 TABLET, FILM COATED ORAL at 11:11

## 2021-11-28 PROBLEM — I10 ESSENTIAL HYPERTENSION, BENIGN: Chronic | Status: ACTIVE | Noted: 2021-11-28

## 2021-11-28 PROBLEM — Z87.891 SMOKING HISTORY: Status: ACTIVE | Noted: 2021-11-28

## 2021-11-28 PROBLEM — G62.9 NEUROPATHY: Status: ACTIVE | Noted: 2021-11-28

## 2021-11-28 PROBLEM — E66.813 CLASS 3 SEVERE OBESITY DUE TO EXCESS CALORIES WITHOUT SERIOUS COMORBIDITY WITH BODY MASS INDEX (BMI) OF 60.0 TO 69.9 IN ADULT: Chronic | Status: ACTIVE | Noted: 2021-11-28

## 2021-11-28 PROBLEM — I87.2 VENOUS INSUFFICIENCY OF BOTH LOWER EXTREMITIES: Status: ACTIVE | Noted: 2021-11-28

## 2021-11-28 PROBLEM — J96.21 ACUTE ON CHRONIC RESPIRATORY FAILURE WITH HYPOXIA AND HYPERCAPNIA: Status: ACTIVE | Noted: 2021-11-28

## 2021-11-28 PROBLEM — J96.22 ACUTE ON CHRONIC RESPIRATORY FAILURE WITH HYPOXIA AND HYPERCAPNIA: Status: ACTIVE | Noted: 2021-11-28

## 2021-11-28 PROBLEM — G47.33 OSA (OBSTRUCTIVE SLEEP APNEA): Chronic | Status: ACTIVE | Noted: 2021-11-28

## 2021-11-28 PROBLEM — E66.01 CLASS 3 SEVERE OBESITY DUE TO EXCESS CALORIES WITHOUT SERIOUS COMORBIDITY WITH BODY MASS INDEX (BMI) OF 60.0 TO 69.9 IN ADULT: Chronic | Status: ACTIVE | Noted: 2021-11-28

## 2021-11-28 PROBLEM — Z82.49 FAMILY HISTORY OF ABDOMINAL AORTIC ANEURYSM (AAA): Status: ACTIVE | Noted: 2021-06-10

## 2021-11-28 PROBLEM — I26.94 MULTIPLE SUBSEGMENTAL PULMONARY EMBOLI WITHOUT ACUTE COR PULMONALE: Chronic | Status: ACTIVE | Noted: 2021-06-10

## 2021-11-28 PROBLEM — Z87.891 SMOKING HISTORY: Chronic | Status: ACTIVE | Noted: 2021-11-28

## 2021-11-28 PROBLEM — E66.01 SEVERE OBESITY (BMI >= 40): Status: ACTIVE | Noted: 2021-11-28

## 2021-11-28 PROBLEM — I26.94 MULTIPLE SUBSEGMENTAL PULMONARY EMBOLI WITHOUT ACUTE COR PULMONALE: Status: ACTIVE | Noted: 2021-06-10

## 2021-11-28 PROBLEM — I10 ESSENTIAL HYPERTENSION, BENIGN: Status: ACTIVE | Noted: 2021-11-28

## 2021-11-28 LAB
ALLENS TEST: ABNORMAL
AORTIC ROOT ANNULUS: 2.7 CM
AV INDEX (PROSTH): 0.79
AV MEAN GRADIENT: 6 MMHG
AV PEAK GRADIENT: 11 MMHG
AV VALVE AREA: 2.91 CM2
AV VELOCITY RATIO: 0.7
BSA FOR ECHO PROCEDURE: 2.82 M2
CV ECHO LV RWT: 0.4 CM
DELSYS: ABNORMAL
DOP CALC AO PEAK VEL: 1.68 M/S
DOP CALC AO VTI: 31.6 CM
DOP CALC LVOT AREA: 3.7 CM2
DOP CALC LVOT DIAMETER: 2.17 CM
DOP CALC LVOT PEAK VEL: 1.18 M/S
DOP CALC LVOT STROKE VOLUME: 92.04 CM3
DOP CALC MV VTI: 28.8 CM
DOP CALCLVOT PEAK VEL VTI: 24.9 CM
E WAVE DECELERATION TIME: 271.26 MSEC
E/A RATIO: 1.34
E/E' RATIO: 10 M/S
ECHO EF ESTIMATED: 52 %
ECHO LV POSTERIOR WALL: 1.1 CM (ref 0.6–1.1)
EJECTION FRACTION: 60 %
EP: 9
ERYTHROCYTE [SEDIMENTATION RATE] IN BLOOD BY WESTERGREN METHOD: 26 MM/H
ERYTHROCYTE [SEDIMENTATION RATE] IN BLOOD BY WESTERGREN METHOD: 34 MM/H
ERYTHROCYTE [SEDIMENTATION RATE] IN BLOOD BY WESTERGREN METHOD: 34 MM/H
FIO2: 35
FIO2: 35
FIO2: 50
FRACTIONAL SHORTENING: 27 % (ref 28–44)
HCO3 UR-SCNC: 37.3 MMOL/L (ref 24–28)
HCO3 UR-SCNC: 37.3 MMOL/L (ref 24–28)
HCO3 UR-SCNC: 40.6 MMOL/L (ref 24–28)
INTERVENTRICULAR SEPTUM: 1.27 CM (ref 0.6–1.1)
IP: 18
IP: 22
IP: 22
LA MAJOR: 6.08 CM
LA WIDTH: 4.93 CM
LEFT ATRIUM SIZE: 4.01 CM
LEFT INTERNAL DIMENSION IN SYSTOLE: 4 CM (ref 2.1–4)
LEFT VENTRICLE DIASTOLIC VOLUME INDEX: 56.49 ML/M2
LEFT VENTRICLE DIASTOLIC VOLUME: 146.31 ML
LEFT VENTRICLE MASS INDEX: 103 G/M2
LEFT VENTRICLE SYSTOLIC VOLUME INDEX: 27.1 ML/M2
LEFT VENTRICLE SYSTOLIC VOLUME: 70.19 ML
LEFT VENTRICULAR INTERNAL DIMENSION IN DIASTOLE: 5.48 CM (ref 3.5–6)
LEFT VENTRICULAR MASS: 266.15 G
LV LATERAL E/E' RATIO: 9.58 M/S
LV SEPTAL E/E' RATIO: 10.45 M/S
LVOT MG: 2.63 MMHG
LVOT MV: 0.77 CM/S
MODE: ABNORMAL
MV MEAN GRADIENT: 5 MMHG
MV PEAK A VEL: 0.86 M/S
MV PEAK E VEL: 1.15 M/S
MV PEAK GRADIENT: 7 MMHG
MV STENOSIS PRESSURE HALF TIME: 78.67 MS
MV VALVE AREA BY CONTINUITY EQUATION: 3.2 CM2
MV VALVE AREA P 1/2 METHOD: 2.8 CM2
PCO2 BLDA: 81 MMHG (ref 35–45)
PCO2 BLDA: 88.4 MMHG (ref 35–45)
PCO2 BLDA: 88.6 MMHG (ref 35–45)
PH SMN: 7.23 [PH] (ref 7.35–7.45)
PH SMN: 7.27 [PH] (ref 7.35–7.45)
PH SMN: 7.27 [PH] (ref 7.35–7.45)
PISA MRMAX VEL: 5.48 M/S
PISA TR MAX VEL: 3.31 M/S
PO2 BLDA: 64 MMHG (ref 80–100)
PO2 BLDA: 74 MMHG (ref 80–100)
PO2 BLDA: 80 MMHG (ref 80–100)
POC BE: 10 MMOL/L
POC BE: 10 MMOL/L
POC BE: 14 MMOL/L
POC SATURATED O2: 87 % (ref 95–100)
POC SATURATED O2: 91 % (ref 95–100)
POC SATURATED O2: 92 % (ref 95–100)
PV PEAK VELOCITY: 1.32 CM/S
RA MAJOR: 4.74 CM
RA WIDTH: 3.79 CM
RIGHT VENTRICULAR END-DIASTOLIC DIMENSION: 3.19 CM
SAMPLE: ABNORMAL
SITE: ABNORMAL
TDI LATERAL: 0.12 M/S
TDI SEPTAL: 0.11 M/S
TDI: 0.12 M/S
TR MAX PG: 44 MMHG

## 2021-11-28 PROCEDURE — 99291 PR CRITICAL CARE, E/M 30-74 MINUTES: ICD-10-PCS | Mod: ,,, | Performed by: NURSE PRACTITIONER

## 2021-11-28 PROCEDURE — 99291 CRITICAL CARE FIRST HOUR: CPT | Mod: ,,, | Performed by: NURSE PRACTITIONER

## 2021-11-28 PROCEDURE — 25000242 PHARM REV CODE 250 ALT 637 W/ HCPCS: Performed by: NURSE PRACTITIONER

## 2021-11-28 PROCEDURE — 94761 N-INVAS EAR/PLS OXIMETRY MLT: CPT

## 2021-11-28 PROCEDURE — 25000003 PHARM REV CODE 250: Performed by: NURSE PRACTITIONER

## 2021-11-28 PROCEDURE — 94640 AIRWAY INHALATION TREATMENT: CPT

## 2021-11-28 PROCEDURE — 20000000 HC ICU ROOM

## 2021-11-28 PROCEDURE — 25000003 PHARM REV CODE 250: Performed by: INTERNAL MEDICINE

## 2021-11-28 PROCEDURE — 94660 CPAP INITIATION&MGMT: CPT

## 2021-11-28 PROCEDURE — 82803 BLOOD GASES ANY COMBINATION: CPT

## 2021-11-28 PROCEDURE — 27000221 HC OXYGEN, UP TO 24 HOURS

## 2021-11-28 PROCEDURE — 97530 THERAPEUTIC ACTIVITIES: CPT

## 2021-11-28 PROCEDURE — 97166 OT EVAL MOD COMPLEX 45 MIN: CPT

## 2021-11-28 PROCEDURE — 63600175 PHARM REV CODE 636 W HCPCS: Performed by: NURSE PRACTITIONER

## 2021-11-28 PROCEDURE — 63600175 PHARM REV CODE 636 W HCPCS: Performed by: INTERNAL MEDICINE

## 2021-11-28 PROCEDURE — 25000003 PHARM REV CODE 250: Performed by: FAMILY MEDICINE

## 2021-11-28 PROCEDURE — 97162 PT EVAL MOD COMPLEX 30 MIN: CPT

## 2021-11-28 PROCEDURE — 99900035 HC TECH TIME PER 15 MIN (STAT)

## 2021-11-28 PROCEDURE — A4216 STERILE WATER/SALINE, 10 ML: HCPCS | Performed by: INTERNAL MEDICINE

## 2021-11-28 PROCEDURE — 36600 WITHDRAWAL OF ARTERIAL BLOOD: CPT

## 2021-11-28 PROCEDURE — 25500020 PHARM REV CODE 255: Performed by: FAMILY MEDICINE

## 2021-11-28 RX ORDER — AZITHROMYCIN 250 MG/1
250 TABLET, FILM COATED ORAL DAILY
Status: DISCONTINUED | OUTPATIENT
Start: 2021-11-29 | End: 2021-11-29 | Stop reason: HOSPADM

## 2021-11-28 RX ORDER — FUROSEMIDE 10 MG/ML
40 INJECTION INTRAMUSCULAR; INTRAVENOUS ONCE
Status: COMPLETED | OUTPATIENT
Start: 2021-11-28 | End: 2021-11-28

## 2021-11-28 RX ORDER — METHYLPREDNISOLONE SOD SUCC 125 MG
40 VIAL (EA) INJECTION EVERY 8 HOURS
Status: DISCONTINUED | OUTPATIENT
Start: 2021-11-28 | End: 2021-11-29 | Stop reason: HOSPADM

## 2021-11-28 RX ORDER — IPRATROPIUM BROMIDE AND ALBUTEROL SULFATE 2.5; .5 MG/3ML; MG/3ML
3 SOLUTION RESPIRATORY (INHALATION)
Status: DISCONTINUED | OUTPATIENT
Start: 2021-11-28 | End: 2021-11-29 | Stop reason: HOSPADM

## 2021-11-28 RX ORDER — BISACODYL 10 MG
10 SUPPOSITORY, RECTAL RECTAL DAILY PRN
Status: DISCONTINUED | OUTPATIENT
Start: 2021-11-28 | End: 2021-11-29 | Stop reason: HOSPADM

## 2021-11-28 RX ORDER — FUROSEMIDE 20 MG/1
20 TABLET ORAL DAILY
Status: DISCONTINUED | OUTPATIENT
Start: 2021-11-29 | End: 2021-11-29 | Stop reason: HOSPADM

## 2021-11-28 RX ORDER — FAMOTIDINE 10 MG/ML
20 INJECTION INTRAVENOUS 2 TIMES DAILY
Status: DISCONTINUED | OUTPATIENT
Start: 2021-11-28 | End: 2021-11-28

## 2021-11-28 RX ORDER — FAMOTIDINE 20 MG/1
20 TABLET, FILM COATED ORAL 2 TIMES DAILY
Status: DISCONTINUED | OUTPATIENT
Start: 2021-11-28 | End: 2021-11-29 | Stop reason: HOSPADM

## 2021-11-28 RX ORDER — MUPIROCIN 20 MG/G
OINTMENT TOPICAL 2 TIMES DAILY
Status: DISCONTINUED | OUTPATIENT
Start: 2021-11-28 | End: 2021-11-29 | Stop reason: HOSPADM

## 2021-11-28 RX ADMIN — APIXABAN 5 MG: 2.5 TABLET, FILM COATED ORAL at 09:11

## 2021-11-28 RX ADMIN — IOHEXOL 100 ML: 350 INJECTION, SOLUTION INTRAVENOUS at 11:11

## 2021-11-28 RX ADMIN — METHYLPREDNISOLONE SODIUM SUCCINATE 40 MG: 125 INJECTION, POWDER, FOR SOLUTION INTRAMUSCULAR; INTRAVENOUS at 01:11

## 2021-11-28 RX ADMIN — IPRATROPIUM BROMIDE AND ALBUTEROL SULFATE 3 ML: 2.5; .5 SOLUTION RESPIRATORY (INHALATION) at 01:11

## 2021-11-28 RX ADMIN — MUPIROCIN: 20 OINTMENT TOPICAL at 09:11

## 2021-11-28 RX ADMIN — IPRATROPIUM BROMIDE AND ALBUTEROL SULFATE 3 ML: 2.5; .5 SOLUTION RESPIRATORY (INHALATION) at 07:11

## 2021-11-28 RX ADMIN — ARFORMOTEROL TARTRATE 15 MCG: 15 SOLUTION RESPIRATORY (INHALATION) at 07:11

## 2021-11-28 RX ADMIN — LOSARTAN POTASSIUM 50 MG: 50 TABLET, FILM COATED ORAL at 09:11

## 2021-11-28 RX ADMIN — FUROSEMIDE 40 MG: 40 INJECTION, SOLUTION INTRAMUSCULAR; INTRAVENOUS at 09:11

## 2021-11-28 RX ADMIN — METHYLPREDNISOLONE SODIUM SUCCINATE 40 MG: 125 INJECTION, POWDER, FOR SOLUTION INTRAMUSCULAR; INTRAVENOUS at 09:11

## 2021-11-28 RX ADMIN — METHYLPREDNISOLONE SODIUM SUCCINATE 80 MG: 125 INJECTION, POWDER, FOR SOLUTION INTRAMUSCULAR; INTRAVENOUS at 06:11

## 2021-11-28 RX ADMIN — Medication 10 ML: at 09:11

## 2021-11-28 RX ADMIN — IPRATROPIUM BROMIDE AND ALBUTEROL SULFATE 3 ML: 2.5; .5 SOLUTION RESPIRATORY (INHALATION) at 12:11

## 2021-11-28 RX ADMIN — BUDESONIDE 0.5 MG: 0.5 INHALANT ORAL at 07:11

## 2021-11-28 RX ADMIN — Medication 10 ML: at 01:11

## 2021-11-28 RX ADMIN — Medication 10 ML: at 06:11

## 2021-11-28 RX ADMIN — FAMOTIDINE 20 MG: 20 TABLET ORAL at 09:11

## 2021-11-28 RX ADMIN — Medication 6 MG: at 09:11

## 2021-11-29 VITALS
DIASTOLIC BLOOD PRESSURE: 88 MMHG | HEART RATE: 80 BPM | OXYGEN SATURATION: 95 % | WEIGHT: 293 LBS | SYSTOLIC BLOOD PRESSURE: 137 MMHG | TEMPERATURE: 99 F | BODY MASS INDEX: 50.02 KG/M2 | HEIGHT: 64 IN | RESPIRATION RATE: 20 BRPM

## 2021-11-29 PROBLEM — G62.9 NEUROPATHY: Status: RESOLVED | Noted: 2021-11-28 | Resolved: 2021-11-29

## 2021-11-29 LAB
ANION GAP SERPL CALC-SCNC: 7 MMOL/L (ref 8–16)
BUN SERPL-MCNC: 9 MG/DL (ref 6–20)
CALCIUM SERPL-MCNC: 8.8 MG/DL (ref 8.7–10.5)
CHLORIDE SERPL-SCNC: 97 MMOL/L (ref 95–110)
CO2 SERPL-SCNC: 35 MMOL/L (ref 23–29)
CREAT SERPL-MCNC: 0.8 MG/DL (ref 0.5–1.4)
EST. GFR  (AFRICAN AMERICAN): >60 ML/MIN/1.73 M^2
EST. GFR  (NON AFRICAN AMERICAN): >60 ML/MIN/1.73 M^2
GLUCOSE SERPL-MCNC: 160 MG/DL (ref 70–110)
MAGNESIUM SERPL-MCNC: 2.1 MG/DL (ref 1.6–2.6)
POTASSIUM SERPL-SCNC: 4.6 MMOL/L (ref 3.5–5.1)
SODIUM SERPL-SCNC: 139 MMOL/L (ref 136–145)

## 2021-11-29 PROCEDURE — 27000221 HC OXYGEN, UP TO 24 HOURS

## 2021-11-29 PROCEDURE — 36415 COLL VENOUS BLD VENIPUNCTURE: CPT | Performed by: NURSE PRACTITIONER

## 2021-11-29 PROCEDURE — 97535 SELF CARE MNGMENT TRAINING: CPT | Performed by: PHYSICAL THERAPIST

## 2021-11-29 PROCEDURE — 25000003 PHARM REV CODE 250: Performed by: INTERNAL MEDICINE

## 2021-11-29 PROCEDURE — 25000242 PHARM REV CODE 250 ALT 637 W/ HCPCS: Performed by: NURSE PRACTITIONER

## 2021-11-29 PROCEDURE — 63700000 PHARM REV CODE 250 ALT 637 W/O HCPCS: Performed by: NURSE PRACTITIONER

## 2021-11-29 PROCEDURE — 80048 BASIC METABOLIC PNL TOTAL CA: CPT | Performed by: NURSE PRACTITIONER

## 2021-11-29 PROCEDURE — 99900035 HC TECH TIME PER 15 MIN (STAT)

## 2021-11-29 PROCEDURE — 25000003 PHARM REV CODE 250: Performed by: NURSE PRACTITIONER

## 2021-11-29 PROCEDURE — 97530 THERAPEUTIC ACTIVITIES: CPT | Performed by: PHYSICAL THERAPIST

## 2021-11-29 PROCEDURE — 97530 THERAPEUTIC ACTIVITIES: CPT

## 2021-11-29 PROCEDURE — A4216 STERILE WATER/SALINE, 10 ML: HCPCS | Performed by: INTERNAL MEDICINE

## 2021-11-29 PROCEDURE — 83735 ASSAY OF MAGNESIUM: CPT | Performed by: NURSE PRACTITIONER

## 2021-11-29 PROCEDURE — 63600175 PHARM REV CODE 636 W HCPCS: Performed by: NURSE PRACTITIONER

## 2021-11-29 PROCEDURE — 94640 AIRWAY INHALATION TREATMENT: CPT

## 2021-11-29 PROCEDURE — 94761 N-INVAS EAR/PLS OXIMETRY MLT: CPT

## 2021-11-29 RX ORDER — FUROSEMIDE 20 MG/1
20 TABLET ORAL 2 TIMES DAILY
Qty: 15 TABLET | Refills: 0 | Status: SHIPPED | OUTPATIENT
Start: 2021-11-29 | End: 2021-11-29 | Stop reason: SDUPTHER

## 2021-11-29 RX ORDER — FUROSEMIDE 20 MG/1
20 TABLET ORAL 2 TIMES DAILY
Qty: 60 TABLET | Refills: 0 | Status: SHIPPED | OUTPATIENT
Start: 2021-11-29 | End: 2021-12-29 | Stop reason: SDUPTHER

## 2021-11-29 RX ADMIN — METHYLPREDNISOLONE SODIUM SUCCINATE 40 MG: 125 INJECTION, POWDER, FOR SOLUTION INTRAMUSCULAR; INTRAVENOUS at 05:11

## 2021-11-29 RX ADMIN — AZITHROMYCIN MONOHYDRATE 250 MG: 250 TABLET ORAL at 08:11

## 2021-11-29 RX ADMIN — IPRATROPIUM BROMIDE AND ALBUTEROL SULFATE 3 ML: 2.5; .5 SOLUTION RESPIRATORY (INHALATION) at 01:11

## 2021-11-29 RX ADMIN — BUDESONIDE 0.5 MG: 0.5 INHALANT ORAL at 07:11

## 2021-11-29 RX ADMIN — Medication 10 ML: at 05:11

## 2021-11-29 RX ADMIN — FUROSEMIDE 20 MG: 20 TABLET ORAL at 08:11

## 2021-11-29 RX ADMIN — IPRATROPIUM BROMIDE AND ALBUTEROL SULFATE 3 ML: 2.5; .5 SOLUTION RESPIRATORY (INHALATION) at 07:11

## 2021-11-29 RX ADMIN — LOSARTAN POTASSIUM 50 MG: 50 TABLET, FILM COATED ORAL at 08:11

## 2021-11-29 RX ADMIN — ARFORMOTEROL TARTRATE 15 MCG: 15 SOLUTION RESPIRATORY (INHALATION) at 07:11

## 2021-11-29 RX ADMIN — MUPIROCIN: 20 OINTMENT TOPICAL at 08:11

## 2021-11-29 RX ADMIN — FAMOTIDINE 20 MG: 20 TABLET ORAL at 08:11

## 2021-11-29 RX ADMIN — APIXABAN 5 MG: 2.5 TABLET, FILM COATED ORAL at 08:11

## 2021-12-29 ENCOUNTER — OFFICE VISIT (OUTPATIENT)
Dept: PULMONOLOGY | Facility: CLINIC | Age: 56
End: 2021-12-29
Payer: COMMERCIAL

## 2021-12-29 VITALS
RESPIRATION RATE: 20 BRPM | WEIGHT: 293 LBS | BODY MASS INDEX: 48.82 KG/M2 | HEIGHT: 65 IN | HEART RATE: 82 BPM | OXYGEN SATURATION: 95 % | SYSTOLIC BLOOD PRESSURE: 169 MMHG | DIASTOLIC BLOOD PRESSURE: 81 MMHG

## 2021-12-29 DIAGNOSIS — R60.0 LOCALIZED EDEMA: ICD-10-CM

## 2021-12-29 DIAGNOSIS — E66.01 CLASS 3 SEVERE OBESITY DUE TO EXCESS CALORIES WITHOUT SERIOUS COMORBIDITY WITH BODY MASS INDEX (BMI) OF 60.0 TO 69.9 IN ADULT: Chronic | ICD-10-CM

## 2021-12-29 DIAGNOSIS — R09.02 EXERCISE HYPOXEMIA: ICD-10-CM

## 2021-12-29 DIAGNOSIS — J96.12 CHRONIC RESPIRATORY FAILURE WITH HYPOXIA AND HYPERCAPNIA: Primary | ICD-10-CM

## 2021-12-29 DIAGNOSIS — J41.0 SIMPLE CHRONIC BRONCHITIS: ICD-10-CM

## 2021-12-29 DIAGNOSIS — J96.11 CHRONIC RESPIRATORY FAILURE WITH HYPOXIA AND HYPERCAPNIA: Primary | ICD-10-CM

## 2021-12-29 DIAGNOSIS — F17.200 SMOKING: ICD-10-CM

## 2021-12-29 PROCEDURE — 1111F PR DISCHARGE MEDS RECONCILED W/ CURRENT OUTPATIENT MED LIST: ICD-10-PCS | Mod: CPTII,S$GLB,, | Performed by: INTERNAL MEDICINE

## 2021-12-29 PROCEDURE — 3079F PR MOST RECENT DIASTOLIC BLOOD PRESSURE 80-89 MM HG: ICD-10-PCS | Mod: CPTII,S$GLB,, | Performed by: INTERNAL MEDICINE

## 2021-12-29 PROCEDURE — 3008F PR BODY MASS INDEX (BMI) DOCUMENTED: ICD-10-PCS | Mod: CPTII,S$GLB,, | Performed by: INTERNAL MEDICINE

## 2021-12-29 PROCEDURE — 99999 PR PBB SHADOW E&M-EST. PATIENT-LVL III: CPT | Mod: PBBFAC,,, | Performed by: INTERNAL MEDICINE

## 2021-12-29 PROCEDURE — 1111F DSCHRG MED/CURRENT MED MERGE: CPT | Mod: CPTII,S$GLB,, | Performed by: INTERNAL MEDICINE

## 2021-12-29 PROCEDURE — 1159F MED LIST DOCD IN RCRD: CPT | Mod: CPTII,S$GLB,, | Performed by: INTERNAL MEDICINE

## 2021-12-29 PROCEDURE — 99215 OFFICE O/P EST HI 40 MIN: CPT | Mod: S$GLB,,, | Performed by: INTERNAL MEDICINE

## 2021-12-29 PROCEDURE — 3008F BODY MASS INDEX DOCD: CPT | Mod: CPTII,S$GLB,, | Performed by: INTERNAL MEDICINE

## 2021-12-29 PROCEDURE — 3077F SYST BP >= 140 MM HG: CPT | Mod: CPTII,S$GLB,, | Performed by: INTERNAL MEDICINE

## 2021-12-29 PROCEDURE — 1159F PR MEDICATION LIST DOCUMENTED IN MEDICAL RECORD: ICD-10-PCS | Mod: CPTII,S$GLB,, | Performed by: INTERNAL MEDICINE

## 2021-12-29 PROCEDURE — 99215 PR OFFICE/OUTPT VISIT, EST, LEVL V, 40-54 MIN: ICD-10-PCS | Mod: S$GLB,,, | Performed by: INTERNAL MEDICINE

## 2021-12-29 PROCEDURE — 3079F DIAST BP 80-89 MM HG: CPT | Mod: CPTII,S$GLB,, | Performed by: INTERNAL MEDICINE

## 2021-12-29 PROCEDURE — 3077F PR MOST RECENT SYSTOLIC BLOOD PRESSURE >= 140 MM HG: ICD-10-PCS | Mod: CPTII,S$GLB,, | Performed by: INTERNAL MEDICINE

## 2021-12-29 PROCEDURE — 99999 PR PBB SHADOW E&M-EST. PATIENT-LVL III: ICD-10-PCS | Mod: PBBFAC,,, | Performed by: INTERNAL MEDICINE

## 2021-12-29 RX ORDER — ALBUTEROL SULFATE 90 UG/1
2 AEROSOL, METERED RESPIRATORY (INHALATION) EVERY 4 HOURS PRN
Qty: 18 G | Refills: 11 | Status: SHIPPED | OUTPATIENT
Start: 2021-12-29 | End: 2023-09-20 | Stop reason: SDUPTHER

## 2021-12-29 RX ORDER — TIOTROPIUM BROMIDE AND OLODATEROL 3.124; 2.736 UG/1; UG/1
2 SPRAY, METERED RESPIRATORY (INHALATION) DAILY
Qty: 4 G | Refills: 11 | Status: SHIPPED | OUTPATIENT
Start: 2021-12-29 | End: 2022-03-07 | Stop reason: SDUPTHER

## 2021-12-29 RX ORDER — ALBUTEROL SULFATE 0.83 MG/ML
2.5 SOLUTION RESPIRATORY (INHALATION)
Qty: 360 ML | Refills: 11 | Status: SHIPPED | OUTPATIENT
Start: 2021-12-29 | End: 2023-09-25 | Stop reason: SDUPTHER

## 2021-12-29 RX ORDER — FUROSEMIDE 20 MG/1
20 TABLET ORAL 2 TIMES DAILY
Qty: 60 TABLET | Refills: 11 | Status: SHIPPED | OUTPATIENT
Start: 2021-12-29 | End: 2023-09-22 | Stop reason: SDUPTHER

## 2021-12-29 NOTE — ASSESSMENT & PLAN NOTE
Patient with Hypercapnic and Hypoxic Respiratory failure which is Chronic.  she is on home oxygen at 2 LPM. Supplemental oxygen was provided and noted-  .   Signs/symptoms of respiratory failure include- increased work of breathing, respiratory distress and wheezing. Contributing diagnoses includes - COPD Labs and images were reviewed. Patient Has recent ABG, which has been reviewed. Will treat underlying causes and adjust management of respiratory failure as follows- see note

## 2021-12-29 NOTE — PROGRESS NOTES
Subjective:       Patient ID: Emy Travis is a 56 y.o. female.    Chief Complaint:   She   presents for evaluation and treatment of chroic resp failure after being discharged from the hospital  4  weeks ago. Since discharge symptoms have gradually improving course since that time. Patient denies fever or chills. Symptoms are aggravated by anyactiviry . Symptoms improve with reast.  Respiratory: positive for cough and dyspnea on exertion, ; Cardiovascular: no chest pain or palpitations.  Patient currently is on oxygen at 2- 3 L/min per nasal cannula..  able to tolerate Continuous Positive Airway Pressure machine    MEDICAL RECORDS FROM THE HOSPITAL REVIEWED:  [facility]  Discharge Summary        Patient Name: Emy Travis  MRN: [id number]  Patient Class: IP- Inpatient  Admission Date: 11/27/2021  Hospital Length of Stay: 2 days  Discharge Date and Time:  11/29/2021 11:46 AM  Attending Physician: Jamila Bolivar MD   Discharging Provider: Jamila Bolivar MD  Primary Care Provider: Primary Doctor No        HPI:   56 year old woman with history of PE on eliquis, morbid obesity who presented to the Ed with worsening shortness of breath over the last one week. She does not see a pulmonologist but has home oxygen .She denies any history of trauma .Her oxygen saturation was found to be 70% on arrival. Records reviewed from care everywhere- she was diagnosed with PE -04/2021-Multiple subsegmental pulmonary emboli without cor pulmonale.     Initial ED vitals         Initial Vitals   BP Pulse Resp Temp SpO2   11/27/21 1133 11/27/21 1133 11/27/21 1133 11/27/21 1225 11/27/21 1133   (!) 142/66 95 (!) 27 98.6 °F (37 °C) (!) 68 %   ABG showed -  Component      Latest Ref Rng & Units 11/28/2021 11/27/2021          12:06 AM     POC PH      7.35 - 7.45 7.232 (LL) 7.249 (LL)   POC PCO2      35 - 45 mmHg 88.6 (HH) 85.3 (HH)   She will be admitted to ICU on continuous bipap        * No surgery  found *       Hospital Course:   This is a 56 y.o. female with a pmh of supermorbid obesity BMI >60, obesity hypoventilation, JOSEFINA, h/o P.E. on anticoagulation, chronic respiratory failure dependent on home O2 who presented to ED with sob. Pt admits to noncompliance with CPAP at home due to discomfort with mask.     Pt had pCO2 81mmHg on admission and pO2 54. She was admitted to ICU started on BiPAP QHS with significant improvement in symptoms. Pt is back to baseline. Discussed case with pulmonology service pt will be discharged home today with new Rx for DME BiPAP machine. Pt has no outpatient pulmonologist will refer to Dr. Gonzalez.     Discussed with patient she understands she needs to stop vaping, limit klonopin at night, address severe obesity, comply with new BiPAP regimen.        Goals of Care Treatment Preferences:  Code Status: Full Code        Consults:           Consults (From admission, onward)         Status Ordering Provider       Inpatient consult to Social Work  Once        Provider:  (Not yet assigned)    Acknowledged LEILANI SPARKS       Inpatient consult to Pulmonology  Once        Provider:  Adama Christensen, LANDON    Completed LEILANI SPARKS             * Acute on chronic respiratory failure with hypoxia and hypercapnia  Patient with Hypercapnic and Hypoxic Respiratory failure which is Acute on chronic.  she is on home oxygen at 2 LPM. Supplemental oxygen was provided and noted- Oxygen Concentration (%):  [30-50] 50.   Signs/symptoms of respiratory failure include- increased work of breathing, respiratory distress and use of accessory muscles. Contributing diagnoses includes - Obesity Hypoventilation Labs and images were reviewed. Patient Has recent ABG, which has been reviewed. Will treat underlying causes and adjust management of respiratory failure as follows-  Continue bipap     JOSEFINA (obstructive sleep apnea)  --likely patient also has obesity hypoventilation  --hypoxia improving with CPAP  QHS  --discussed with patient she is noncompliant with CPAP at home        Smoking history  Counseled cessation        Venous insufficiency of both lower extremities  Out patient follow up with vascular surgery         Class 3 severe obesity due to excess calories without serious comorbidity with body mass index (BMI) of 60.0 to 69.9 in adult  Out patient follow up for structured weight loss program        Essential hypertension, benign  Continue Cozaar        Multiple subsegmental pulmonary emboli without acute cor pulmonale     --previously noted on CTA done 04/2021Does -  --Continue Eliquis     Neuropathy-resolved as of 11/29/2021  She is on trileptal and is not sure of the exact indication .This medication can be used for neuropathy , bipolar or seizures-will continue for now .                Final Active Diagnoses:     Diagnosis Date Noted POA    PRINCIPAL PROBLEM:  Acute on chronic respiratory failure with hypoxia and hypercapnia [J96.21, J96.22] 11/28/2021 Yes    Essential hypertension, benign [I10] 11/28/2021 Yes       Chronic    Class 3 severe obesity due to excess calories without serious comorbidity with body mass index (BMI) of 60.0 to 69.9 in adult [E66.01, Z68.44] 11/28/2021 Not Applicable       Chronic    Venous insufficiency of both lower extremities [I87.2] 11/28/2021 Yes    Smoking history [Z87.891] 11/28/2021 Not Applicable       Chronic    JOSEFINA (obstructive sleep apnea) [G47.33] 11/28/2021 Yes       Chronic    Multiple subsegmental pulmonary emboli without acute cor pulmonale [I26.94] 06/10/2021 Yes       Chronic       Problems Resolved During this Admission:     Diagnosis Date Noted Date Resolved POA    Neuropathy [G62.9] 11/28/2021 11/29/2021 Unknown         Discharged Condition: fair     [place] [facility] [place]     Follow Up:         Follow-up Information           Filemon Gonzalez MD In 1 month.    Specialty: Pulmonary Disease  Why: hospital discharge follow up JOSEFINA / obesity  "hypoventilation  Contact information:  [address]  [place] [address]  [phone]                       Primary Doctor No In 1 week.    Why: hospital discharge f/u           hospital f/up    HPI  Past Medical History:   Diagnosis Date    Hypertension     Pulmonary embolism      No past surgical history on file.  Social History     Socioeconomic History    Marital status:    Tobacco Use    Smoking status: Current Every Day Smoker     Packs/day: 1.00    Smokeless tobacco: Never Used   Substance and Sexual Activity    Alcohol use: No    Drug use: No     Review of Systems   Constitutional: Positive for fatigue. Negative for fever.   HENT: Positive for postnasal drip, rhinorrhea and congestion.    Eyes: Negative for redness and itching.   Respiratory: Positive for cough, sputum production, shortness of breath, dyspnea on extertion, use of rescue inhaler and Paroxysmal Nocturnal Dyspnea.    Cardiovascular: Negative for chest pain, palpitations and leg swelling.   Genitourinary: Negative for difficulty urinating and hematuria.   Endocrine: Negative for cold intolerance and heat intolerance.    Skin: Negative for rash.   Gastrointestinal: Negative for nausea and abdominal pain.   Neurological: Negative for dizziness, syncope, weakness and light-headedness.   Hematological: Negative for adenopathy. Does not bruise/bleed easily.   Psychiatric/Behavioral: Negative for sleep disturbance. The patient is not nervous/anxious.        Objective:   BP (!) 169/81   Pulse 82   Resp 20   Ht 5' 5" (1.651 m)   Wt (!) 176 kg (388 lb 0.2 oz)   SpO2 95%   BMI 64.57 kg/m²      Physical Exam  Vitals and nursing note reviewed.   Constitutional:       Appearance: She is well-developed and well-nourished. She is obese.   HENT:      Head: Normocephalic and atraumatic.      Nose: Congestion present.      Mouth/Throat:      Pharynx: No oropharyngeal exudate.   Eyes:      Conjunctiva/sclera: Conjunctivae normal.      " Pupils: Pupils are equal, round, and reactive to light.   Neck:      Thyroid: No thyromegaly.      Vascular: No JVD.      Trachea: No tracheal deviation.   Cardiovascular:      Rate and Rhythm: Normal rate and regular rhythm.      Heart sounds: Normal heart sounds.   Pulmonary:      Effort: Pulmonary effort is normal. No respiratory distress.      Breath sounds: Examination of the right-lower field reveals wheezing. Examination of the left-lower field reveals wheezing. Decreased breath sounds and wheezing present. No rhonchi or rales.   Chest:      Chest wall: No tenderness.   Abdominal:      General: Bowel sounds are normal.      Palpations: Abdomen is soft.   Musculoskeletal:         General: No edema. Normal range of motion.      Cervical back: Neck supple.   Lymphadenopathy:      Cervical: No cervical adenopathy.   Skin:     General: Skin is warm and dry.   Neurological:      Mental Status: She is alert and oriented to person, place, and time.       Personal Diagnostic Review  Chest x-ray: pul edema and cardiomegaly    Results for ELLIE OLVERA (MRN [id number]) as of 12/29/2021 14:49   Ref. Range 11/28/2021 05:05   POC PH Latest Ref Range: 7.35 - 7.45  7.270 (LL)   POC PCO2 Latest Ref Range: 35 - 45 mmHg 88.4 (HH)   POC PO2 Latest Ref Range: 80 - 100 mmHg 74 (L)   POC BE Latest Ref Range: -2 to 2 mmol/L 14   POC HCO3 Latest Ref Range: 24 - 28 mmol/L 40.6 (H)   POC SATURATED O2 Latest Ref Range: 95 - 100 % 91 (L)   FiO2 Unknown 50   Sample Unknown ARTERIAL   DelSys Unknown CPAP/BiPAP   Allens Test Unknown Pass   Site Unknown RR   Mode Unknown BiPAP   Rate Unknown 34       Echo  · The left ventricle is mildly enlarged with eccentric hypertrophy and   normal systolic function.  · The estimated ejection fraction is 60%.  · Normal left ventricular diastolic function.  · With low normal right ventricular systolic function.     Subcostal views not well visualized.   CTA Chest Non Coronary  Narrative: EXAMINATION:  CTA  CHEST NON CORONARY    CLINICAL HISTORY:  Shortness of breath.  Pulmonary embolism (PE) suspected, high prob;    TECHNIQUE:  Standard CTA of the chest performed with 100ml IV Omnipaque 350 utilizing 3-D MIP reformations.    COMPARISON:  None    FINDINGS:  Unfortunately the study is suboptimal.  There is suboptimal opacification of the pulmonary arteries, probably in part related to the patient's size/obesity.  The heart size is enlarged.    The main pulmonary artery appears unremarkable.  Limited assessment of the peripheral branches.    The thoracic aorta reveals minor calcified plaque.    There are small mediastinal lymph nodes.    There is bibasilar atelectasis.    No definite infiltrate.    No definite pleural effusion.    Mild thoracic spondylosis.    Hepatomegaly with fatty infiltration.  Cholelithiasis.  Impression: Limited CTA of the pulmonary arteries.  No large pulmonary embolism.  If symptoms persist follow-up VQ scan suggested.    Hepatomegaly with fatty infiltration.  Cholelithiasis.    All CT scans at this facility use dose modulation, iterative reconstruction and/or weight based dosing when appropriate to reduce radiation dose to as low as reasonably achievable.    Electronically signed by: Vikash Freitas MD  Date:    11/28/2021  Time:    11:20  US Lower Extremity Veins Bilateral  Narrative: EXAMINATION:  US LOWER EXTREMITY VEINS BILATERAL    CLINICAL HISTORY:  Leg pain and swelling.  Dvt;    COMPARISON:  None    FINDINGS:  Technically limited study secondary to obesity.    Duplex and color flow imaging with spectral wave analysis performed.    The veins demonstrate normal compressibility and phasicity. No evidence of filling defect.  Impression: Negative for DVT.    Electronically signed by: Vikash Freitas MD  Date:    11/28/2021  Time:    10:24    .[email or web address]  Pulmonary Function Tests 12/29/2021   SpO2 95   Height 65   Weight 6208.15   BMI (Calculated) 64.6   Some recent data might be hidden            Assessment:       Chronic respiratory failure with hypoxia and hypercapnia  Patient with Hypercapnic and Hypoxic Respiratory failure which is Chronic.  she is on home oxygen at 2 LPM. Supplemental oxygen was provided and noted-  .   Signs/symptoms of respiratory failure include- increased work of breathing, respiratory distress and wheezing. Contributing diagnoses includes - COPD Labs and images were reviewed. Patient Has recent ABG, which has been reviewed. Will treat underlying causes and adjust management of respiratory failure as follows- see note    1. Simple chronic bronchitis    2. Class 3 severe obesity due to excess calories without serious comorbidity with body mass index (BMI) of 60.0 to 69.9 in adult    3. Smoking    4. Localized edema    5. Chronic respiratory failure with hypoxia and hypercapnia    6. Exercise hypoxemia        Outpatient Encounter Medications as of 12/29/2021   Medication Sig Dispense Refill    apixaban (ELIQUIS) 5 mg Tab Take 5 mg by mouth.      atorvastatin (LIPITOR) 40 MG tablet       buprenorphine-naloxone 2-0.5 mg (SUBOXONE) 2-0.5 mg Subl Place under the tongue every 6 (six) hours as needed.       clonazePAM (KLONOPIN) 1 MG tablet Take 1 mg by mouth.      GABAPENTIN ORAL Take by mouth.      losartan (COZAAR) 50 MG tablet Take 50 mg by mouth.      [DISCONTINUED] furosemide (LASIX) 20 MG tablet Take 1 tablet (20 mg total) by mouth 2 (two) times a day. For fluid 60 tablet 0    albuterol (PROVENTIL) 2.5 mg /3 mL (0.083 %) nebulizer solution Take 3 mLs (2.5 mg total) by nebulization every 4 to 6 hours as needed for Wheezing or Shortness of Breath. 360 mL 11    albuterol (PROVENTIL/VENTOLIN HFA) 90 mcg/actuation inhaler Inhale 2 puffs into the lungs every 4 (four) hours as needed for Wheezing or Shortness of Breath. 18 g 11    furosemide (LASIX) 20 MG tablet Take 1 tablet (20 mg total) by mouth 2 (two) times a day. For fluid 60 tablet 11    tiotropium-olodateroL  "(STIOLTO RESPIMAT) 2.5-2.5 mcg/actuation Mist Inhale 2 puffs into the lungs once daily. Controller 4 g 11     No facility-administered encounter medications on file as of 12/29/2021.     Orders Placed This Encounter   Procedures    NEBULIZER KIT (SUPPLIES) FOR HOME USE     Order Specific Question:   Height:     Answer:   5' 5" (1.651 m)     Order Specific Question:   Weight:     Answer:   176 kg (388 lb 0.2 oz)     Order Specific Question:   Length of need (1-99 months):     Answer:   99     Order Specific Question:   Mask or Mouthpiece?     Answer:   Mouthpiece    NEBULIZER FOR HOME USE     [facility] for CPAP/Oxygen/Nebulizer supplies.  Customer Service: [phone]  Call: [phone]  Fax: [phone]  Billing Inquiries: [phone] or [phone]       Order Specific Question:   Height:     Answer:   5' 5" (1.651 m)     Order Specific Question:   Weight:     Answer:   176 kg (388 lb 0.2 oz)     Order Specific Question:   Length of need (1-99 months):     Answer:   99    Ambulatory referral/consult to Smoking Cessation Program     Standing Status:   Future     Standing Expiration Date:   1/29/2023     Referral Priority:   Routine     Referral Type:   Consultation     Referral Reason:   Specialty Services Required     Requested Specialty:   CTTS     Number of Visits Requested:   1    Six Minute Walk Test to qualify for Home Oxygen     Standing Status:   Future     Standing Expiration Date:   12/29/2022     Order Specific Question:   Release to patient     Answer:   Immediate     Plan:       Requested Prescriptions     Signed Prescriptions Disp Refills    albuterol (PROVENTIL) 2.5 mg /3 mL (0.083 %) nebulizer solution 360 mL 11     Sig: Take 3 mLs (2.5 mg total) by nebulization every 4 to 6 hours as needed for Wheezing or Shortness of Breath.    albuterol (PROVENTIL/VENTOLIN HFA) 90 mcg/actuation inhaler 18 g 11     Sig: Inhale 2 puffs into the lungs every 4 (four) hours as needed for Wheezing or " Shortness of Breath.    furosemide (LASIX) 20 MG tablet 60 tablet 11     Sig: Take 1 tablet (20 mg total) by mouth 2 (two) times a day. For fluid    tiotropium-olodateroL (STIOLTO RESPIMAT) 2.5-2.5 mcg/actuation Mist 4 g 11     Sig: Inhale 2 puffs into the lungs once daily. Controller     Simple chronic bronchitis  -     NEBULIZER KIT (SUPPLIES) FOR HOME USE  -     NEBULIZER FOR HOME USE  -     albuterol (PROVENTIL) 2.5 mg /3 mL (0.083 %) nebulizer solution; Take 3 mLs (2.5 mg total) by nebulization every 4 to 6 hours as needed for Wheezing or Shortness of Breath.  Dispense: 360 mL; Refill: 11  -     albuterol (PROVENTIL/VENTOLIN HFA) 90 mcg/actuation inhaler; Inhale 2 puffs into the lungs every 4 (four) hours as needed for Wheezing or Shortness of Breath.  Dispense: 18 g; Refill: 11  -     tiotropium-olodateroL (STIOLTO RESPIMAT) 2.5-2.5 mcg/actuation Mist; Inhale 2 puffs into the lungs once daily. Controller  Dispense: 4 g; Refill: 11  -     Six Minute Walk Test to qualify for Home Oxygen; Future    Class 3 severe obesity due to excess calories without serious comorbidity with body mass index (BMI) of 60.0 to 69.9 in adult    Smoking  -     Ambulatory referral/consult to Smoking Cessation Program; Future; Expected date: 01/05/2022    Localized edema  -     furosemide (LASIX) 20 MG tablet; Take 1 tablet (20 mg total) by mouth 2 (two) times a day. For fluid  Dispense: 60 tablet; Refill: 11    Chronic respiratory failure with hypoxia and hypercapnia  -     Six Minute Walk Test to qualify for Home Oxygen; Future    Exercise hypoxemia  -     Six Minute Walk Test to qualify for Home Oxygen; Future           Follow up in about 6 months (around 6/29/2022) for 6 min walk - on return.    Review of medical records from hospital. Medical records from hospital were reviewed during office visit - these included but were not limited to review of radiographic studies and /or radiologists reports, laboratory studies, discharge  summaries, procedure notes, consultations and other transcribed notes.    MEDICAL DECISION MAKING: Moderate to high complexity.  Overall, the multiple problems listed are of moderate to high severity that may impact quality of life and activities of daily living. Side effects of medications, treatment plan as well as options and alternatives reviewed and discussed with patient. There was counseling of patient concerning these issues.    Total time spent in counseling and coordination of care - 60  minutes of total time spent on the encounter, which includes face to face time and non-face to face time preparing to see the patient (eg, review of tests), Obtaining and/or reviewing separately obtained history, Documenting clinical information in the electronic or other health record, Independently interpreting results (not separately reported) and communicating results to the patient/family/caregiver, or Care coordination (not separately reported).    Time was used in discussion of prognosis, risks, benefits of treatment, instructions and compliance with regimen . Discussion with other physicians and/or health care providers - home health or for use of durable medical equipment (oxygen, nebulizers, CPAP, BiPAP) occurred.

## 2022-03-04 ENCOUNTER — TELEPHONE (OUTPATIENT)
Dept: PULMONOLOGY | Facility: CLINIC | Age: 57
End: 2022-03-04
Payer: COMMERCIAL

## 2022-03-04 ENCOUNTER — LAB VISIT (OUTPATIENT)
Dept: LAB | Facility: HOSPITAL | Age: 57
End: 2022-03-04
Attending: FAMILY MEDICINE
Payer: COMMERCIAL

## 2022-03-04 DIAGNOSIS — E11.9 DIABETES MELLITUS WITHOUT COMPLICATION: ICD-10-CM

## 2022-03-04 DIAGNOSIS — I10 ESSENTIAL HYPERTENSION, MALIGNANT: Primary | ICD-10-CM

## 2022-03-04 LAB
ALBUMIN SERPL BCP-MCNC: 3.4 G/DL (ref 3.5–5.2)
ALP SERPL-CCNC: 75 U/L (ref 55–135)
ALT SERPL W/O P-5'-P-CCNC: 35 U/L (ref 10–44)
ANION GAP SERPL CALC-SCNC: 10 MMOL/L (ref 8–16)
AST SERPL-CCNC: 23 U/L (ref 10–40)
BASOPHILS # BLD AUTO: 0.06 K/UL (ref 0–0.2)
BASOPHILS NFR BLD: 1 % (ref 0–1.9)
BILIRUB SERPL-MCNC: 0.4 MG/DL (ref 0.1–1)
BUN SERPL-MCNC: 13 MG/DL (ref 6–20)
CALCIUM SERPL-MCNC: 8.7 MG/DL (ref 8.7–10.5)
CHLORIDE SERPL-SCNC: 97 MMOL/L (ref 95–110)
CHOLEST SERPL-MCNC: 229 MG/DL (ref 120–199)
CHOLEST/HDLC SERPL: 5.2 {RATIO} (ref 2–5)
CO2 SERPL-SCNC: 32 MMOL/L (ref 23–29)
CREAT SERPL-MCNC: 0.7 MG/DL (ref 0.5–1.4)
DIFFERENTIAL METHOD: ABNORMAL
EOSINOPHIL # BLD AUTO: 0.1 K/UL (ref 0–0.5)
EOSINOPHIL NFR BLD: 2.2 % (ref 0–8)
ERYTHROCYTE [DISTWIDTH] IN BLOOD BY AUTOMATED COUNT: 14.7 % (ref 11.5–14.5)
EST. GFR  (AFRICAN AMERICAN): >60 ML/MIN/1.73 M^2
EST. GFR  (NON AFRICAN AMERICAN): >60 ML/MIN/1.73 M^2
ESTIMATED AVG GLUCOSE: 183 MG/DL (ref 68–131)
GLUCOSE SERPL-MCNC: 164 MG/DL (ref 70–110)
HBA1C MFR BLD: 8 % (ref 4–5.6)
HCT VFR BLD AUTO: 42.6 % (ref 37–48.5)
HDLC SERPL-MCNC: 44 MG/DL (ref 40–75)
HDLC SERPL: 19.2 % (ref 20–50)
HGB BLD-MCNC: 12.5 G/DL (ref 12–16)
IMM GRANULOCYTES # BLD AUTO: 0.03 K/UL (ref 0–0.04)
IMM GRANULOCYTES NFR BLD AUTO: 0.5 % (ref 0–0.5)
LDLC SERPL CALC-MCNC: 121 MG/DL (ref 63–159)
LYMPHOCYTES # BLD AUTO: 2.2 K/UL (ref 1–4.8)
LYMPHOCYTES NFR BLD: 35 % (ref 18–48)
MCH RBC QN AUTO: 27 PG (ref 27–31)
MCHC RBC AUTO-ENTMCNC: 29.3 G/DL (ref 32–36)
MCV RBC AUTO: 92 FL (ref 82–98)
MONOCYTES # BLD AUTO: 0.5 K/UL (ref 0.3–1)
MONOCYTES NFR BLD: 7.2 % (ref 4–15)
NEUTROPHILS # BLD AUTO: 3.4 K/UL (ref 1.8–7.7)
NEUTROPHILS NFR BLD: 54.1 % (ref 38–73)
NONHDLC SERPL-MCNC: 185 MG/DL
NRBC BLD-RTO: 0 /100 WBC
PLATELET # BLD AUTO: 208 K/UL (ref 150–450)
PMV BLD AUTO: 10.5 FL (ref 9.2–12.9)
POTASSIUM SERPL-SCNC: 4.3 MMOL/L (ref 3.5–5.1)
PROT SERPL-MCNC: 7.1 G/DL (ref 6–8.4)
RBC # BLD AUTO: 4.63 M/UL (ref 4–5.4)
SODIUM SERPL-SCNC: 139 MMOL/L (ref 136–145)
T4 FREE SERPL-MCNC: 0.85 NG/DL (ref 0.71–1.51)
TRIGL SERPL-MCNC: 320 MG/DL (ref 30–150)
TSH SERPL DL<=0.005 MIU/L-ACNC: 4.26 UIU/ML (ref 0.4–4)
WBC # BLD AUTO: 6.26 K/UL (ref 3.9–12.7)

## 2022-03-04 PROCEDURE — 84443 ASSAY THYROID STIM HORMONE: CPT | Mod: PO | Performed by: FAMILY MEDICINE

## 2022-03-04 PROCEDURE — 36415 COLL VENOUS BLD VENIPUNCTURE: CPT | Mod: PO | Performed by: FAMILY MEDICINE

## 2022-03-04 PROCEDURE — 80053 COMPREHEN METABOLIC PANEL: CPT | Mod: PO | Performed by: FAMILY MEDICINE

## 2022-03-04 PROCEDURE — 80061 LIPID PANEL: CPT | Performed by: FAMILY MEDICINE

## 2022-03-04 PROCEDURE — 83036 HEMOGLOBIN GLYCOSYLATED A1C: CPT | Performed by: FAMILY MEDICINE

## 2022-03-04 PROCEDURE — 84439 ASSAY OF FREE THYROXINE: CPT | Mod: PO | Performed by: FAMILY MEDICINE

## 2022-03-04 PROCEDURE — 85025 COMPLETE CBC W/AUTO DIFF WBC: CPT | Mod: PO | Performed by: FAMILY MEDICINE

## 2022-03-04 NOTE — TELEPHONE ENCOUNTER
----- Message from Denise Preciado sent at 3/4/2022 10:55 AM CST -----  Regardinnd message  Contact: [phone]  Patient called, requested a courtesy call from Dr Gonzalez office about his oxygen, [employer] is asking for medical update stating that is medical necessary. Please call and advise. Thank you

## 2022-03-07 ENCOUNTER — CLINICAL SUPPORT (OUTPATIENT)
Dept: PULMONOLOGY | Facility: CLINIC | Age: 57
End: 2022-03-07
Payer: COMMERCIAL

## 2022-03-07 ENCOUNTER — OFFICE VISIT (OUTPATIENT)
Dept: PULMONOLOGY | Facility: CLINIC | Age: 57
End: 2022-03-07
Payer: COMMERCIAL

## 2022-03-07 VITALS
BODY MASS INDEX: 48.82 KG/M2 | HEIGHT: 65 IN | DIASTOLIC BLOOD PRESSURE: 82 MMHG | HEIGHT: 65 IN | WEIGHT: 293 LBS | OXYGEN SATURATION: 96 % | HEART RATE: 101 BPM | RESPIRATION RATE: 18 BRPM | WEIGHT: 293 LBS | BODY MASS INDEX: 48.82 KG/M2 | SYSTOLIC BLOOD PRESSURE: 140 MMHG

## 2022-03-07 DIAGNOSIS — U07.1 COVID: ICD-10-CM

## 2022-03-07 DIAGNOSIS — G47.33 OSA (OBSTRUCTIVE SLEEP APNEA): Chronic | ICD-10-CM

## 2022-03-07 DIAGNOSIS — J41.0 SIMPLE CHRONIC BRONCHITIS: ICD-10-CM

## 2022-03-07 DIAGNOSIS — R06.83 SNORING: ICD-10-CM

## 2022-03-07 DIAGNOSIS — R40.0 DAYTIME SLEEPINESS: ICD-10-CM

## 2022-03-07 DIAGNOSIS — J96.11 CHRONIC RESPIRATORY FAILURE WITH HYPOXIA AND HYPERCAPNIA: ICD-10-CM

## 2022-03-07 DIAGNOSIS — E66.01 CLASS 3 SEVERE OBESITY DUE TO EXCESS CALORIES WITH SERIOUS COMORBIDITY AND BODY MASS INDEX (BMI) OF 50.0 TO 59.9 IN ADULT: ICD-10-CM

## 2022-03-07 DIAGNOSIS — G47.30 SLEEP-DISORDERED BREATHING: ICD-10-CM

## 2022-03-07 DIAGNOSIS — R09.02 HYPOXEMIA REQUIRING SUPPLEMENTAL OXYGEN: Primary | ICD-10-CM

## 2022-03-07 DIAGNOSIS — Z99.81 HYPOXEMIA REQUIRING SUPPLEMENTAL OXYGEN: Primary | ICD-10-CM

## 2022-03-07 DIAGNOSIS — J96.12 CHRONIC RESPIRATORY FAILURE WITH HYPOXIA AND HYPERCAPNIA: ICD-10-CM

## 2022-03-07 DIAGNOSIS — R09.02 EXERCISE HYPOXEMIA: ICD-10-CM

## 2022-03-07 DIAGNOSIS — I10 ESSENTIAL HYPERTENSION, BENIGN: Chronic | ICD-10-CM

## 2022-03-07 DIAGNOSIS — I26.94 MULTIPLE SUBSEGMENTAL PULMONARY EMBOLI WITHOUT ACUTE COR PULMONALE: Chronic | ICD-10-CM

## 2022-03-07 PROBLEM — E66.813 CLASS 3 SEVERE OBESITY DUE TO EXCESS CALORIES WITH SERIOUS COMORBIDITY AND BODY MASS INDEX (BMI) OF 50.0 TO 59.9 IN ADULT: Status: ACTIVE | Noted: 2021-11-28

## 2022-03-07 PROCEDURE — 3052F HG A1C>EQUAL 8.0%<EQUAL 9.0%: CPT | Mod: CPTII,S$GLB,, | Performed by: NURSE PRACTITIONER

## 2022-03-07 PROCEDURE — 99214 PR OFFICE/OUTPT VISIT, EST, LEVL IV, 30-39 MIN: ICD-10-PCS | Mod: 25,S$GLB,, | Performed by: NURSE PRACTITIONER

## 2022-03-07 PROCEDURE — 94618 PULMONARY STRESS TESTING: CPT | Mod: S$GLB,,, | Performed by: INTERNAL MEDICINE

## 2022-03-07 PROCEDURE — 99999 PR PBB SHADOW E&M-EST. PATIENT-LVL IV: ICD-10-PCS | Mod: PBBFAC,,, | Performed by: NURSE PRACTITIONER

## 2022-03-07 PROCEDURE — 3008F BODY MASS INDEX DOCD: CPT | Mod: CPTII,S$GLB,, | Performed by: NURSE PRACTITIONER

## 2022-03-07 PROCEDURE — 3079F PR MOST RECENT DIASTOLIC BLOOD PRESSURE 80-89 MM HG: ICD-10-PCS | Mod: CPTII,S$GLB,, | Performed by: NURSE PRACTITIONER

## 2022-03-07 PROCEDURE — 94618 PULMONARY STRESS TESTING: ICD-10-PCS | Mod: S$GLB,,, | Performed by: INTERNAL MEDICINE

## 2022-03-07 PROCEDURE — 1160F PR REVIEW ALL MEDS BY PRESCRIBER/CLIN PHARMACIST DOCUMENTED: ICD-10-PCS | Mod: CPTII,S$GLB,, | Performed by: NURSE PRACTITIONER

## 2022-03-07 PROCEDURE — 99999 PR PBB SHADOW E&M-EST. PATIENT-LVL I: CPT | Mod: PBBFAC,,,

## 2022-03-07 PROCEDURE — 3052F PR MOST RECENT HEMOGLOBIN A1C LEVEL 8.0 - < 9.0%: ICD-10-PCS | Mod: CPTII,S$GLB,, | Performed by: NURSE PRACTITIONER

## 2022-03-07 PROCEDURE — 3008F PR BODY MASS INDEX (BMI) DOCUMENTED: ICD-10-PCS | Mod: CPTII,S$GLB,, | Performed by: NURSE PRACTITIONER

## 2022-03-07 PROCEDURE — 1159F PR MEDICATION LIST DOCUMENTED IN MEDICAL RECORD: ICD-10-PCS | Mod: CPTII,S$GLB,, | Performed by: NURSE PRACTITIONER

## 2022-03-07 PROCEDURE — 4010F PR ACE/ARB THEARPY RXD/TAKEN: ICD-10-PCS | Mod: CPTII,S$GLB,, | Performed by: NURSE PRACTITIONER

## 2022-03-07 PROCEDURE — 1160F RVW MEDS BY RX/DR IN RCRD: CPT | Mod: CPTII,S$GLB,, | Performed by: NURSE PRACTITIONER

## 2022-03-07 PROCEDURE — 3077F SYST BP >= 140 MM HG: CPT | Mod: CPTII,S$GLB,, | Performed by: NURSE PRACTITIONER

## 2022-03-07 PROCEDURE — 99999 PR PBB SHADOW E&M-EST. PATIENT-LVL IV: CPT | Mod: PBBFAC,,, | Performed by: NURSE PRACTITIONER

## 2022-03-07 PROCEDURE — 99214 OFFICE O/P EST MOD 30 MIN: CPT | Mod: 25,S$GLB,, | Performed by: NURSE PRACTITIONER

## 2022-03-07 PROCEDURE — 3077F PR MOST RECENT SYSTOLIC BLOOD PRESSURE >= 140 MM HG: ICD-10-PCS | Mod: CPTII,S$GLB,, | Performed by: NURSE PRACTITIONER

## 2022-03-07 PROCEDURE — 99999 PR PBB SHADOW E&M-EST. PATIENT-LVL I: ICD-10-PCS | Mod: PBBFAC,,,

## 2022-03-07 PROCEDURE — 3079F DIAST BP 80-89 MM HG: CPT | Mod: CPTII,S$GLB,, | Performed by: NURSE PRACTITIONER

## 2022-03-07 PROCEDURE — 1159F MED LIST DOCD IN RCRD: CPT | Mod: CPTII,S$GLB,, | Performed by: NURSE PRACTITIONER

## 2022-03-07 PROCEDURE — 4010F ACE/ARB THERAPY RXD/TAKEN: CPT | Mod: CPTII,S$GLB,, | Performed by: NURSE PRACTITIONER

## 2022-03-07 RX ORDER — TIOTROPIUM BROMIDE AND OLODATEROL 3.124; 2.736 UG/1; UG/1
2 SPRAY, METERED RESPIRATORY (INHALATION) DAILY
Qty: 4 G | Refills: 11 | Status: SHIPPED | OUTPATIENT
Start: 2022-03-07 | End: 2023-09-22 | Stop reason: ALTCHOICE

## 2022-03-07 NOTE — PROGRESS NOTES
Subjective:      Patient ID: Emy Travis is a 56 y.o. female.    Chief Complaint: Sleep Apnea and chronic respiratory failure     HPI: Emy Travis presents to clinic for follow up for JOSEFINA and chronic respiratory failure, with review 6MWD, she would like consideration for battery operated portable.   Obstructive sleep apnea on CPAP diagnosed with [facility]   She is on CPAP patient is unsure of settings, records requested from [facility]  She is not on CPAP related to recall. She has registered her CPAP machine with [employer], awaiting receiving replacement. And she has been intolerant to CPAP so has been not compliant with CPAP since diagnosis 2021.    Patient reports benefit from CPAP use.  Patient reports no complaints. Full face mask is tolerated.     Patient is currently using NC 3 L for sleep since 12/2021 when sent home on oxygen.   HME: [facility] for home oxygen    Gardner Sleepiness Scale   EPWORTH SLEEPINESS SCALE 3/7/2022 12/29/2021   Sitting and reading 3 3   Watching TV 3 3   Sitting, inactive in a public place (e.g. a theatre or a meeting) 0 0   As a passenger in a car for an hour without a break 0 0   Lying down to rest in the afternoon when circumstances permit 3 3   Sitting and talking to someone 0 0   Sitting quietly after a lunch without alcohol 0 0   In a car, while stopped for a few minutes in traffic 0 0   Total score 9 9       Sleep Apnea  Patient has been observed snoring with apnea.   Patient reports non restful' sleep.  She denies morning headache.   She reports day time napping.  Day time napping duration 1 Hour  She denies recent weight gain.  Cardiovascular risk factors: hypertension and obesity  Bed time is 0900  Wake time is 0500  Sleep onset is within  15 Minutes.  Sleep maintenance difficulties related to frequent night time awakening  Wake after sleep onset occurs four times a night.  Nocturia occurs four times a night,   Sleep aids : Yes, Klonopin 1 mg  Dry  mouth : Yes, sometimes  Sleep walking: No  Sleep talking : No  Sleep eating:No  Vivid Dreams : No  Cataplexy : No    [place] Sleepiness Scale   EPWORTH SLEEPINESS SCALE 3/7/2022 12/29/2021   Sitting and reading 3 3   Watching TV 3 3   Sitting, inactive in a public place (e.g. a theatre or a meeting) 0 0   As a passenger in a car for an hour without a break 0 0   Lying down to rest in the afternoon when circumstances permit 3 3   Sitting and talking to someone 0 0   Sitting quietly after a lunch without alcohol 0 0   In a car, while stopped for a few minutes in traffic 0 0   Total score 9 9       Neck circumference is 20.  Mallampati score 3    STOP - BANG Questionnaire:   1. Snoring : Do you snore loudly ?     YES  2. Tired : Do you often feel tired, fatigued, or sleepy during daytime?   YES  3. Observed: Has anyone observed you stop breathing during your sleep?     YES  4. Blood pressure : Do you have or are you being treated for high blood pressure?    YES  5. BMI :BMI more than 35 kg/m2?    YES  6. Age : Age over 50 yr old?    YES  7. Neck circumference: Neck circumference greater than 40 cm?    YES  8. Gender: Gender male?   NO    SCORE: 7    High risk of JOSEFINA: Yes 5 - 8  Intermediate risk of JOSEFINA: Yes 3 - 4  Low risk of JOSEFINA: Yes 0 - 2    Simple chronic Bronchitis  Stable on Stiolto, Albuterol inhaler. Albuterol nebs   No cough, no mucous, chronic shortness of breath exertion. Benefits and adherent to NC 3 L continuous.     Previous Report Reviewed: lab reports and office notes     Past Medical History: The following portions of the patient's history were reviewed and updated as appropriate:   She  has no past surgical history on file.  Her family history is not on file.  She  reports that she quit smoking about 15 months ago. Her smoking use included vaping with nicotine and cigarettes. She started smoking about 43 years ago. She has a 41.00 pack-year smoking history. She has never used smokeless tobacco. She  "reports that she does not drink alcohol and does not use drugs.  She has a current medication list which includes the following prescription(s): albuterol, albuterol, apixaban, atorvastatin, buprenorphine-naloxone 2-0.5 mg, clonazepam, furosemide, losartan, and stiolto respimat.  She has No Known Allergies..    The following portions of the patient's history were reviewed and updated as appropriate: allergies, current medications, past family history, past medical history, past social history, past surgical history and problem list.    Review of Systems   Constitutional: Negative for fever, chills, weight loss, weight gain, activity change, appetite change, fatigue and night sweats.   HENT: Negative for postnasal drip, rhinorrhea, sinus pressure, voice change and congestion.    Eyes: Negative for redness and itching.   Respiratory: Positive for apnea and snoring. Negative for cough, sputum production, chest tightness, shortness of breath, wheezing, orthopnea, asthma nighttime symptoms, dyspnea on extertion, use of rescue inhaler and somnolence.    Cardiovascular: Negative.  Negative for chest pain, palpitations and leg swelling.   Genitourinary: Negative for difficulty urinating and hematuria.   Endocrine: Negative for cold intolerance and heat intolerance.    Musculoskeletal: Negative for arthralgias, gait problem, joint swelling and myalgias.   Skin: Negative.    Gastrointestinal: Negative for nausea, vomiting, abdominal pain and acid reflux.   Neurological: Negative for dizziness, weakness, light-headedness and headaches.   Hematological: Negative for adenopathy. No excessive bruising.   All other systems reviewed and are negative.     Objective:   BP (!) 140/82   Pulse 101   Resp 18   Ht 5' 5" (1.651 m)   Wt (!) 156 kg (344 lb)   SpO2 96%   BMI 57.24 kg/m²   Physical Exam  Vitals and nursing note reviewed.   Constitutional:       General: She is awake. She is not in acute distress.     Appearance: Normal " appearance. She is well-developed and well-groomed. She is morbidly obese. She is not ill-appearing or toxic-appearing.   HENT:      Head: Normocephalic.      Right Ear: External ear normal.      Left Ear: External ear normal.      Nose: Nose normal.      Mouth/Throat:      Pharynx: No oropharyngeal exudate.   Eyes:      Conjunctiva/sclera: Conjunctivae normal.   Cardiovascular:      Rate and Rhythm: Normal rate and regular rhythm.      Heart sounds: Normal heart sounds.   Pulmonary:      Effort: Pulmonary effort is normal.      Breath sounds: Normal breath sounds. No stridor.   Abdominal:      Palpations: Abdomen is soft.   Musculoskeletal:         General: Normal range of motion.      Cervical back: Normal range of motion and neck supple.   Lymphadenopathy:      Cervical: No cervical adenopathy.   Skin:     General: Skin is warm and dry.   Neurological:      Mental Status: She is alert and oriented to person, place, and time.   Psychiatric:         Behavior: Behavior normal. Behavior is cooperative.         Thought Content: Thought content normal.         Judgment: Judgment normal.         Personal Diagnostic Review  CPAP download    Phase Oxygen Assessment Supplemental O2 Heart   Rate Blood Pressure Nely Dyspnea Scale Rating   Resting 85 % (91% when placed on 2L) Room Air (increased to 2L) 104 bpm 165/84 3   Exercise             Minute             1 88 % (90% when increased to 3L) 2 L/M (increased to 3L) 115 bpm       2 87 % (90% when increased to 4l) 3 L/M (Increased to 4L) 120 bpm       3 90 % 4 L/M 115 bpm       4 92 % 4 L/M 124 bpm       5 91 % 4 L/M 127 bpm       6  90 % 4 L/M 124 bpm 147/70 4   Recovery             Minute             1 97 % 4 L/M 115 bpm       2 95 % 4 L/M 107 bpm       3 98 % 4 L/M 104 bpm       4 96 % 4 L/M 107 bpm 137/60 2      Six Minute Walk Summary  6MWT Status: completed without stopping  Patient Reported: Dyspnea         Interpretation:  Did the patient stop or pause?: No  Total  Time Walked (Calculated): 360 seconds  Final Partial Lap Distance (feet): 0 feet  Total Distance Meters (Calculated): 121.92 meters  Predicted Distance Meters (Calculated): 345.92 meters  Percentage of Predicted (Calculated): 35.25  Peak VO2 (Calculated): 7.64  Mets: 2.18  Has The Patient Had a Previous Six Minute Walk Test?: No     Previous 6MWT Results  Has The Patient Had a Previous Six Minute Walk Test?: No         Last Resulted: 03/07/22           Assessment:     1. Hypoxemia requiring supplemental oxygen    2. Simple chronic bronchitis    3. Class 3 severe obesity due to excess calories with serious comorbidity and body mass index (BMI) of 50.0 to 59.9 in adult    4. Chronic respiratory failure with hypoxia and hypercapnia    5. JOSEFINA (obstructive sleep apnea)    6. Essential hypertension, benign    7. Multiple subsegmental pulmonary emboli without acute cor pulmonale    8. Sleep-disordered breathing    9. Snoring    10. Daytime sleepiness        Orders Placed This Encounter   Procedures    OXYGEN FOR HOME USE     Patient would benefit from battery operated portable oxygen system. Please provide portable battery concentrator for activity.  On home oxygen  HME: [facility]     Order Specific Question:   Liter Flow     Answer:   4     Comments:   NC 3 L at rest. NC 4 l activity     Order Specific Question:   Duration     Answer:   Continuous     Order Specific Question:   Qualifying Test Performed at:     Answer:   Rest     Comments:   3/7/2022 6MWD      Order Specific Question:   Oxygen saturation:     Answer:   85     Order Specific Question:   Portable mode:     Answer:   pulse dose acceptable     Order Specific Question:   Mode:     Answer:   Portable concentrator     Order Specific Question:   Route     Answer:   nasal cannula     Order Specific Question:   Device:     Answer:   home concentrator with portable concentrator     Order Specific Question:   Length of need (in months):     Answer:   99 mos     Order  "Specific Question:   Patient condition with qualifying saturation     Answer:   Other - List qualifying diagnosis and code     Order Specific Question:   Select a diagnosis & list the code in the comments     Answer:   Morbid (severe) obesity with alveolar hypoventilation [[id number]]     Order Specific Question:   Height:     Answer:   5' 5" (1.651 m)     Order Specific Question:   Weight:     Answer:   156 kg (344 lb)     Order Specific Question:   Alternative treatment measures have been tried or considered and deemed clinically ineffective.     Answer:   Yes    PULM - Arterial Blood Gases--in addition to PFT only     Standing Status:   Future     Standing Expiration Date:   3/7/2023     Order Specific Question:   Release to patient     Answer:   Immediate    Complete PFT with bronchodilator     Standing Status:   Future     Standing Expiration Date:   3/7/2023     Order Specific Question:   Release to patient     Answer:   Immediate    Polysomnogram (CPAP will be added if patient meets diagnostic criteria.)     Standing Status:   Future     Standing Expiration Date:   3/7/2023     Scheduling Instructions:      Split night study if meets criteria, consideration for BIPAP intolerant to CPAP     Plan:     Problem List Items Addressed This Visit     JOSEFINA (obstructive sleep apnea) (Chronic)     On CPAP            Multiple subsegmental pulmonary emboli without acute cor pulmonale (Chronic)     On Eliquis, managed by primary care provider            Essential hypertension, benign (Chronic)     Managed by primary care provider             Class 3 severe obesity due to excess calories with serious comorbidity and body mass index (BMI) of 50.0 to 59.9 in adult     Encouraged calorie reduction and 30 minutes of exercise daily. Discussed impact of obesity on general health.  Wt Readings from Last 9 Encounters:   03/07/22 (!) 156 kg (344 lb)   03/07/22 (!) 156 kg (344 lb)   12/29/21 (!) 176 kg (388 lb 0.2 oz)   11/27/21 (!) " 176.1 kg (388 lb 3.7 oz)   11/20/20 (!) 149 kg (328 lb 7.8 oz)   11/23/15 115.7 kg (255 lb)   Body mass index is 57.24 kg/m².             Relevant Orders    Complete PFT with bronchodilator    Polysomnogram (CPAP will be added if patient meets diagnostic criteria.)    OXYGEN FOR HOME USE    Chronic respiratory failure with hypoxia and hypercapnia     Patient with Hypercapnic and Hypoxic Respiratory failure which is Chronic.  she is on home oxygen at 2 LPM.           Relevant Orders    PULM - Arterial Blood Gases--in addition to PFT only    Complete PFT with bronchodilator    Polysomnogram (CPAP will be added if patient meets diagnostic criteria.)    OXYGEN FOR HOME USE      Other Visit Diagnoses     Hypoxemia requiring supplemental oxygen    -  Primary    Relevant Orders    Polysomnogram (CPAP will be added if patient meets diagnostic criteria.)    OXYGEN FOR HOME USE    Simple chronic bronchitis        Relevant Medications    tiotropium-olodateroL (STIOLTO RESPIMAT) 2.5-2.5 mcg/actuation Mist    Other Relevant Orders    Complete PFT with bronchodilator    Sleep-disordered breathing        Relevant Orders    Polysomnogram (CPAP will be added if patient meets diagnostic criteria.)    Snoring        Relevant Orders    Polysomnogram (CPAP will be added if patient meets diagnostic criteria.)    Daytime sleepiness        Relevant Orders    Polysomnogram (CPAP will be added if patient meets diagnostic criteria.)        Patient has been intolerant to CPAP since diagnosed with [facility]  Proceed with PSG with Bipap titration study  ABG on room air this week, may need consideration for Trilogy vent.   History of COPD chronic bronchitis type controlled on Stiolto   Continue Stiolto, albuterol inhaler, albuterol nebs   3/7/2022 6MWD patient qualified for home O2. NC 3 L continuous and sleep. NC 4 L activity.      ([facility]) - [facility]  Reviewed therapeutic goals for positive airway pressure therapy.  Ideal is usage 100% of  nights for 6 - 8 hours per night. Minimum usage is 70% of night for at least 4 hours per night used.     Follow up for respiratory failure this week ABG. fu JOSEFINA after PSG/titration study. next available cpft .

## 2022-03-07 NOTE — ASSESSMENT & PLAN NOTE
Encouraged calorie reduction and 30 minutes of exercise daily. Discussed impact of obesity on general health.  Wt Readings from Last 9 Encounters:   03/07/22 (!) 156 kg (344 lb)   03/07/22 (!) 156 kg (344 lb)   12/29/21 (!) 176 kg (388 lb 0.2 oz)   11/27/21 (!) 176.1 kg (388 lb 3.7 oz)   11/20/20 (!) 149 kg (328 lb 7.8 oz)   11/23/15 115.7 kg (255 lb)   Body mass index is 57.24 kg/m².

## 2022-03-07 NOTE — ASSESSMENT & PLAN NOTE
Patient with Hypercapnic and Hypoxic Respiratory failure which is Chronic.  she is on home oxygen at 2 LPM.

## 2022-03-07 NOTE — PROCEDURES
"  [facility] - Pulmonary Function  Six Minute Walk     SUMMARY     Ordering Provider: Dr. Gonzalez   Interpreting Provider: Dr. Gonzalez  Performing nurse/tech/RT: GILBERT Malave, RRT  Diagnosis:  (chronic respiratory failure with hypoxia and hypercapnia)  Height: 5' 5" (165.1 cm)  Weight: (!) 156 kg (344 lb)  BMI (Calculated): 57.2   Patient Race:             Phase Oxygen Assessment Supplemental O2 Heart   Rate Blood Pressure Nely Dyspnea Scale Rating   Resting 85 % (91% when placed on 2L) Room Air (increased to 2L) 104 bpm 165/84 3   Exercise        Minute        1 88 % (90% when increased to 3L) 2 L/M (increased to 3L) 115 bpm     2 87 % (90% when increased to 4l) 3 L/M (Increased to 4L) 120 bpm     3 90 % 4 L/M 115 bpm     4 92 % 4 L/M 124 bpm     5 91 % 4 L/M 127 bpm     6  90 % 4 L/M 124 bpm 147/70 4   Recovery        Minute        1 97 % 4 L/M 115 bpm     2 95 % 4 L/M 107 bpm     3 98 % 4 L/M 104 bpm     4 96 % 4 L/M 107 bpm 137/60 2     Six Minute Walk Summary  6MWT Status: completed without stopping  Patient Reported: Dyspnea     Interpretation:  Did the patient stop or pause?: No                                         Total Time Walked (Calculated): 360 seconds  Final Partial Lap Distance (feet): 0 feet  Total Distance Meters (Calculated): 121.92 meters  Predicted Distance Meters (Calculated): 345.92 meters  Percentage of Predicted (Calculated): 35.25  Peak VO2 (Calculated): 7.64  Mets: 2.18  Has The Patient Had a Previous Six Minute Walk Test?: No       Previous 6MWT Results  Has The Patient Had a Previous Six Minute Walk Test?: No    Interpretation:  Total distance walked in six minutes is severely reduced indicating an severe reduction in functional capacity.  There was significant severe oxygen desaturation to 85 % at rest on room air prior to exercise (oxygen saturation less than 89%).The patient was exercised with supplemental oxygen as noted above. Clinical correlation suggested.    Patient met criteria " for oxygen prescription.  [] Mild exercise-induced hypoxemia described as an arterial oxygen saturation of 93-95% (or 3-4% less than at rest),  [] Moderate exercise-induced hypoxemia as 89-93%  [x] Severe exercise induced hypoxemia as < 89% O2 saturation.  Medicare Criteria for oxygen prescription comments:   When arterial oxygen saturation is at or below 88% during exercise on room air (severe exercise induced hypoxemia) then the patient falls under Medicare Group 1 criteria for supplemental oxygen prescription.  Details about Medicare Group Criteria coverage can be found at http://www.cms.[facility].gov/manuals/downloads/     Filemon Gonzalez MD

## 2022-03-30 ENCOUNTER — EXTERNAL HOME HEALTH (OUTPATIENT)
Dept: HOME HEALTH SERVICES | Facility: HOSPITAL | Age: 57
End: 2022-03-30
Payer: COMMERCIAL

## 2023-09-18 ENCOUNTER — TELEPHONE (OUTPATIENT)
Dept: PULMONOLOGY | Facility: CLINIC | Age: 58
End: 2023-09-18
Payer: COMMERCIAL

## 2023-09-18 NOTE — TELEPHONE ENCOUNTER
----- Message from Hina Hedrick sent at 9/18/2023  3:26 PM CDT -----  Contact: Emy  .Type:  Patient Returning Call    Who Called:Emy   Who Left Message for Patient:Nurse  Does the patient know what this is regarding?:Yes  Would the patient rather a call back or a response via [facility]? Call back  Best Call Back Number:[phone]  Additional Information: NA                    Thanks  KT

## 2023-09-18 NOTE — TELEPHONE ENCOUNTER
----- Message from Mary Gurrola MA sent at 9/18/2023  9:10 AM CDT -----  Contact: Emy @ [phone]  The patient calling to get scheduled for a follow up appointment. Please give her a call back at [phone].

## 2023-09-20 DIAGNOSIS — J41.0 SIMPLE CHRONIC BRONCHITIS: ICD-10-CM

## 2023-09-20 RX ORDER — ALBUTEROL SULFATE 90 UG/1
2 AEROSOL, METERED RESPIRATORY (INHALATION) EVERY 4 HOURS PRN
Qty: 18 G | Refills: 11 | Status: SHIPPED | OUTPATIENT
Start: 2023-09-20 | End: 2023-09-22 | Stop reason: SDUPTHER

## 2023-09-22 ENCOUNTER — OFFICE VISIT (OUTPATIENT)
Dept: PULMONOLOGY | Facility: CLINIC | Age: 58
End: 2023-09-22
Payer: COMMERCIAL

## 2023-09-22 VITALS
SYSTOLIC BLOOD PRESSURE: 156 MMHG | OXYGEN SATURATION: 92 % | BODY MASS INDEX: 48.82 KG/M2 | RESPIRATION RATE: 18 BRPM | WEIGHT: 293 LBS | HEIGHT: 65 IN | HEART RATE: 101 BPM | DIASTOLIC BLOOD PRESSURE: 88 MMHG

## 2023-09-22 DIAGNOSIS — J41.0 SIMPLE CHRONIC BRONCHITIS: ICD-10-CM

## 2023-09-22 DIAGNOSIS — R60.0 BILATERAL LOWER EXTREMITY EDEMA: ICD-10-CM

## 2023-09-22 DIAGNOSIS — R60.0 LOCALIZED EDEMA: ICD-10-CM

## 2023-09-22 DIAGNOSIS — R09.02 EXERCISE HYPOXEMIA: ICD-10-CM

## 2023-09-22 DIAGNOSIS — J96.11 CHRONIC RESPIRATORY FAILURE WITH HYPOXIA AND HYPERCAPNIA: ICD-10-CM

## 2023-09-22 DIAGNOSIS — I26.94 MULTIPLE SUBSEGMENTAL PULMONARY EMBOLI WITHOUT ACUTE COR PULMONALE: Primary | Chronic | ICD-10-CM

## 2023-09-22 DIAGNOSIS — J96.12 CHRONIC RESPIRATORY FAILURE WITH HYPOXIA AND HYPERCAPNIA: ICD-10-CM

## 2023-09-22 DIAGNOSIS — I87.2 VENOUS INSUFFICIENCY OF BOTH LOWER EXTREMITIES: ICD-10-CM

## 2023-09-22 DIAGNOSIS — G47.33 OSA (OBSTRUCTIVE SLEEP APNEA): Chronic | ICD-10-CM

## 2023-09-22 DIAGNOSIS — E66.01 CLASS 3 SEVERE OBESITY DUE TO EXCESS CALORIES WITH SERIOUS COMORBIDITY AND BODY MASS INDEX (BMI) OF 50.0 TO 59.9 IN ADULT: ICD-10-CM

## 2023-09-22 PROCEDURE — 4010F PR ACE/ARB THEARPY RXD/TAKEN: ICD-10-PCS | Mod: CPTII,S$GLB,, | Performed by: INTERNAL MEDICINE

## 2023-09-22 PROCEDURE — 4010F ACE/ARB THERAPY RXD/TAKEN: CPT | Mod: CPTII,S$GLB,, | Performed by: INTERNAL MEDICINE

## 2023-09-22 PROCEDURE — 3077F SYST BP >= 140 MM HG: CPT | Mod: CPTII,S$GLB,, | Performed by: INTERNAL MEDICINE

## 2023-09-22 PROCEDURE — 3079F DIAST BP 80-89 MM HG: CPT | Mod: CPTII,S$GLB,, | Performed by: INTERNAL MEDICINE

## 2023-09-22 PROCEDURE — 3008F BODY MASS INDEX DOCD: CPT | Mod: CPTII,S$GLB,, | Performed by: INTERNAL MEDICINE

## 2023-09-22 PROCEDURE — 1159F MED LIST DOCD IN RCRD: CPT | Mod: CPTII,S$GLB,, | Performed by: INTERNAL MEDICINE

## 2023-09-22 PROCEDURE — 3079F PR MOST RECENT DIASTOLIC BLOOD PRESSURE 80-89 MM HG: ICD-10-PCS | Mod: CPTII,S$GLB,, | Performed by: INTERNAL MEDICINE

## 2023-09-22 PROCEDURE — 1159F PR MEDICATION LIST DOCUMENTED IN MEDICAL RECORD: ICD-10-PCS | Mod: CPTII,S$GLB,, | Performed by: INTERNAL MEDICINE

## 2023-09-22 PROCEDURE — 3008F PR BODY MASS INDEX (BMI) DOCUMENTED: ICD-10-PCS | Mod: CPTII,S$GLB,, | Performed by: INTERNAL MEDICINE

## 2023-09-22 PROCEDURE — 99215 OFFICE O/P EST HI 40 MIN: CPT | Mod: 25,S$GLB,, | Performed by: INTERNAL MEDICINE

## 2023-09-22 PROCEDURE — 99999 PR PBB SHADOW E&M-EST. PATIENT-LVL IV: ICD-10-PCS | Mod: PBBFAC,,, | Performed by: INTERNAL MEDICINE

## 2023-09-22 PROCEDURE — 99215 PR OFFICE/OUTPT VISIT, EST, LEVL V, 40-54 MIN: ICD-10-PCS | Mod: 25,S$GLB,, | Performed by: INTERNAL MEDICINE

## 2023-09-22 PROCEDURE — 3077F PR MOST RECENT SYSTOLIC BLOOD PRESSURE >= 140 MM HG: ICD-10-PCS | Mod: CPTII,S$GLB,, | Performed by: INTERNAL MEDICINE

## 2023-09-22 PROCEDURE — 99999 PR PBB SHADOW E&M-EST. PATIENT-LVL IV: CPT | Mod: PBBFAC,,, | Performed by: INTERNAL MEDICINE

## 2023-09-22 RX ORDER — ROSUVASTATIN CALCIUM 10 MG/1
10 TABLET, COATED ORAL DAILY
COMMUNITY

## 2023-09-22 RX ORDER — FUROSEMIDE 20 MG/1
20 TABLET ORAL 2 TIMES DAILY
Qty: 60 TABLET | Refills: 11 | Status: SHIPPED | OUTPATIENT
Start: 2023-09-22

## 2023-09-22 RX ORDER — BUDESONIDE, GLYCOPYRROLATE, AND FORMOTEROL FUMARATE 160; 9; 4.8 UG/1; UG/1; UG/1
2 AEROSOL, METERED RESPIRATORY (INHALATION) 2 TIMES DAILY
Qty: 10.7 G | Refills: 11 | Status: SHIPPED | OUTPATIENT
Start: 2023-09-22 | End: 2023-11-01 | Stop reason: SDUPTHER

## 2023-09-22 RX ORDER — ALBUTEROL SULFATE 90 UG/1
2 AEROSOL, METERED RESPIRATORY (INHALATION) EVERY 4 HOURS PRN
Qty: 18 G | Refills: 11 | Status: SHIPPED | OUTPATIENT
Start: 2023-09-22

## 2023-09-22 NOTE — PROGRESS NOTES
Subjective:     Patient ID: Emy Ayala is a 58 y.o. female.    Chief Complaint:      HPI    COPD  She presents for evaluation and treatment of COPD. The patient is currently having symptoms / an exacerbation. Current symptoms include acute dyspnea, chronic dyspnea, non-productive cough, wheezing, and feeling dizzy . Symptoms have been present since several years ago and have been gradually worsening. She denies chills and fever. Associated symptoms include poor exercise tolerance, shortness of breath, and weakness.  This episode appears to have been triggered by no identifiable factor. Treatments tried for the current exacerbation: albuterol inhaler and albuterol nebulizer. The patient has been having similar episodes for approximately 2 years. She uses 1 pillows at night. Patient currently is on oxygen at 3- 4 L/min per nasal cannula.. The patient is having no constitutional symptoms, denying fever, chills, anorexia, or weight loss. The patient has been hospitalized for this condition before. She has a history of 40 + pack years. The patient is experiencing exercise intolerance (difficulty walking 1 blocks on flat ground).    History of Pulmonary Embolism on chronic anticoagulation  At time of Pulmonary Embolism had a low oxygen was being brought to surgery  Laid up for pack pain - now resolved    Swelling in both legs    Past Medical History:   Diagnosis Date    Hypertension     Pulmonary embolism      History reviewed. No pertinent surgical history.  Review of patient's allergies indicates:  No Known Allergies  Current Outpatient Medications on File Prior to Visit   Medication Sig Dispense Refill    buprenorphine-naloxone 2-0.5 mg (SUBOXONE) 2-0.5 mg Subl Place under the tongue every 6 (six) hours as needed.       clonazePAM (KLONOPIN) 1 MG tablet Take 1 mg by mouth.      losartan (COZAAR) 50 MG tablet Take 50 mg by mouth.      rosuvastatin (CRESTOR) 10 MG tablet Take 10 mg by mouth once daily.       No  "current facility-administered medications on file prior to visit.     Social History     Socioeconomic History    Marital status:    Tobacco Use    Smoking status: Former     Current packs/day: 0.00     Average packs/day: 1 pack/day for 41.9 years (41.9 ttl pk-yrs)     Types: Vaping with nicotine, Cigarettes     Start date:      Quit date: 2020     Years since quittin.8    Smokeless tobacco: Never   Substance and Sexual Activity    Alcohol use: No    Drug use: No     History reviewed. No pertinent family history.    Review of Systems   Constitutional:  Positive for fatigue. Negative for fever.   HENT:  Positive for postnasal drip, rhinorrhea and congestion.    Eyes:  Negative for redness and itching.   Respiratory:  Positive for cough, sputum production, shortness of breath, dyspnea on extertion, use of rescue inhaler and Paroxysmal Nocturnal Dyspnea.    Cardiovascular:  Negative for chest pain, palpitations and leg swelling.   Genitourinary:  Negative for difficulty urinating and hematuria.   Endocrine:  Negative for cold intolerance and heat intolerance.    Skin:  Negative for rash.   Gastrointestinal:  Negative for nausea and abdominal pain.   Neurological:  Negative for dizziness, syncope, weakness and light-headedness.   Hematological:  Negative for adenopathy. Does not bruise/bleed easily.   Psychiatric/Behavioral:  Negative for sleep disturbance. The patient is not nervous/anxious.        Objective:      BP (!) 156/88   Pulse 101   Resp 18   Ht 5' 5" (1.651 m)   Wt (!) 156.8 kg (345 lb 9.1 oz)   SpO2 (!) 92%   BMI 57.51 kg/m²   Physical Exam  Vitals and nursing note reviewed.   Constitutional:       Appearance: She is well-developed. She is obese.   HENT:      Head: Normocephalic and atraumatic.      Nose: Nose normal.      Mouth/Throat:      Pharynx: No oropharyngeal exudate.   Eyes:      Conjunctiva/sclera: Conjunctivae normal.      Pupils: Pupils are equal, round, and reactive to " "light.   Neck:      Thyroid: No thyromegaly.      Vascular: No JVD.      Trachea: No tracheal deviation.   Cardiovascular:      Rate and Rhythm: Normal rate and regular rhythm.      Heart sounds: Normal heart sounds.   Pulmonary:      Effort: Pulmonary effort is normal. No respiratory distress.      Breath sounds: Examination of the right-lower field reveals wheezing. Examination of the left-lower field reveals wheezing. Decreased breath sounds and wheezing present. No rhonchi or rales.   Chest:      Chest wall: No tenderness.   Abdominal:      General: Bowel sounds are normal.      Palpations: Abdomen is soft.   Musculoskeletal:         General: Normal range of motion.      Cervical back: Neck supple.      Right lower leg: Edema present.      Left lower leg: Edema present.   Lymphadenopathy:      Cervical: No cervical adenopathy.   Skin:     General: Skin is warm and dry.   Neurological:      Mental Status: She is alert and oriented to person, place, and time.       Personal Diagnostic Review  Chest X Ray          9/22/2023     1:12 PM   Pulmonary Studies Review   SpO2 92 %   Height 5' 5" (1.651 m)   Weight 156.8 kg (345 lb 9.1 oz)   BMI (Calculated) 57.5   Predicted Distance 192.72   Predicted Distance Meters (Calculated) 332.72 meters       Echo  · The left ventricle is mildly enlarged with eccentric hypertrophy and   normal systolic function.  · The estimated ejection fraction is 60%.  · Normal left ventricular diastolic function.  · With low normal right ventricular systolic function.     Subcostal views not well visualized.   CTA Chest Non Coronary  Narrative: EXAMINATION:  CTA CHEST NON CORONARY    CLINICAL HISTORY:  Shortness of breath.  Pulmonary embolism (PE) suspected, high prob;    TECHNIQUE:  Standard CTA of the chest performed with 100ml IV Omnipaque 350 utilizing 3-D MIP reformations.    COMPARISON:  None    FINDINGS:  Unfortunately the study is suboptimal.  There is suboptimal opacification of the " "pulmonary arteries, probably in part related to the patient's size/obesity.  The heart size is enlarged.    The main pulmonary artery appears unremarkable.  Limited assessment of the peripheral branches.    The thoracic aorta reveals minor calcified plaque.    There are small mediastinal lymph nodes.    There is bibasilar atelectasis.    No definite infiltrate.    No definite pleural effusion.    Mild thoracic spondylosis.    Hepatomegaly with fatty infiltration.  Cholelithiasis.  Impression: Limited CTA of the pulmonary arteries.  No large pulmonary embolism.  If symptoms persist follow-up VQ scan suggested.    Hepatomegaly with fatty infiltration.  Cholelithiasis.    All CT scans at this facility use dose modulation, iterative reconstruction and/or weight based dosing when appropriate to reduce radiation dose to as low as reasonably achievable.    Electronically signed by: Vikash Freitas MD  Date:    11/28/2021  Time:    11:20  US Lower Extremity Veins Bilateral  Narrative: EXAMINATION:  US LOWER EXTREMITY VEINS BILATERAL    CLINICAL HISTORY:  Leg pain and swelling.  Dvt;    COMPARISON:  None    FINDINGS:  Technically limited study secondary to obesity.    Duplex and color flow imaging with spectral wave analysis performed.    The veins demonstrate normal compressibility and phasicity. No evidence of filling defect.  Impression: Negative for DVT.    Electronically signed by: Vikash Freitas MD  Date:    11/28/2021  Time:    10:24      Office Spirometry Results:         9/22/2023     1:12 PM 3/7/2022     3:41 PM 3/7/2022     3:00 PM 12/29/2021     2:39 PM 11/29/2021     1:59 PM 11/29/2021    11:00 AM 11/29/2021    10:30 AM   Pulmonary Function Tests   SpO2 92 % 96 %  95 % 95 % 92 % 92 %   Ordering Provider   Dr. Gonzalez       Performing nurse/tech/RT   C. Credeur, RRT       Height 5' 5" (1.651 m) 5' 5" (1.651 m) 5' 5" (1.651 m) 5' 5" (1.651 m)      Weight 156.8 kg (345 lb 9.1 oz) 156 kg (344 lb) 156 kg (344 lb) " "176 kg (388 lb 0.2 oz)      BMI (Calculated) 57.5 57.2 57.2 64.6      Patient Race          6MWT Status   completed without stopping       Patient Reported   Dyspnea       Was O2 used?   Yes       Delivery Method   Cannula;Pull Tank       6MW Distance walked (feet)   400 feet       Distance walked (meters)   121.92 meters       Did patient stop?   No       Type of assistive device(s) used?   a walker       Oxygen Saturation   85 %       Supplemental Oxygen   Room Air       Heart Rate   104 bpm       Blood Pressure   165/84       Nely Dyspnea Rating    moderate       Oxygen Saturation   90 %       Supplemental Oxygen   4 L/M       Heart Rate   124 bpm       Blood Pressure   147/70       Nely Dyspnea Rating    somewhat heavy       Recovery Time (seconds)   240 seconds       Oxygen Saturation   96 %       Supplemental Oxygen   4 L/M       Heart Rate   107 bpm       Blood Pressure   137/60       Nely Dyspnea Rating    light       Is procedure ready for interpretation?   Yes       Oxygen Qualification?   Yes       Oxygen Saturation   85 %       Supplemental Oxygen   Room Air       Heart Rate   104 bpm       Blood Pressure   165/84       Nely Dyspnea Rating    moderate       Oxygen Saturation   90 %       Supplemental Oxygen   4 L/M       Heart Rate   107 bpm       Blood Pressure   147/70       Nely Dyspnea Rating    somewhat heavy       Recovery Time (seconds)   240 seconds       Oxygen Saturation   96 %       Supplemental Oxygen   4 L/M       Heart Rate   107 bpm       Blood Pressure   137/60       Nely Dyspnea Rating    light             9/22/2023     1:12 PM   Pulmonary Studies Review   SpO2 92 %   Height 5' 5" (1.651 m)   Weight 156.8 kg (345 lb 9.1 oz)   BMI (Calculated) 57.5   Predicted Distance 192.72   Predicted Distance Meters (Calculated) 332.72 meters   [facility] - Pulmonary Function  Six Minute Walk      SUMMARY      Ordering Provider: Dr. Gonzalez              Interpreting Provider: Dr. Gonzalez  Performing " "nurse/tech/RT: GILBERT Malave, RRT  Diagnosis:  (chronic respiratory failure with hypoxia and hypercapnia)  Height: 5' 5" (165.1 cm)  Weight: (!) 156 kg (344 lb)  BMI (Calculated): 57.2              Patient Race:                                                                Phase Oxygen Assessment Supplemental O2 Heart   Rate Blood Pressure Nely Dyspnea Scale Rating   Resting 85 % (91% when placed on 2L) Room Air (increased to 2L) 104 bpm 165/84 3   Exercise             Minute             1 88 % (90% when increased to 3L) 2 L/M (increased to 3L) 115 bpm       2 87 % (90% when increased to 4l) 3 L/M (Increased to 4L) 120 bpm       3 90 % 4 L/M 115 bpm       4 92 % 4 L/M 124 bpm       5 91 % 4 L/M 127 bpm       6  90 % 4 L/M 124 bpm 147/70 4   Recovery             Minute             1 97 % 4 L/M 115 bpm       2 95 % 4 L/M 107 bpm       3 98 % 4 L/M 104 bpm       4 96 % 4 L/M 107 bpm 137/60 2      Six Minute Walk Summary  6MWT Status: completed without stopping  Patient Reported: Dyspnea         Interpretation:  Did the patient stop or pause?: No  Total Time Walked (Calculated): 360 seconds  Final Partial Lap Distance (feet): 0 feet  Total Distance Meters (Calculated): 121.92 meters  Predicted Distance Meters (Calculated): 345.92 meters  Percentage of Predicted (Calculated): 35.25  Peak VO2 (Calculated): 7.64  Mets: 2.18  Has The Patient Had a Previous Six Minute Walk Test?: No     Previous 6MWT Results  Has The Patient Had a Previous Six Minute Walk Test?: No     Interpretation:  Total distance walked in six minutes is severely reduced indicating an severe reduction in functional capacity.  There was significant severe oxygen desaturation to 85 % at rest on room air prior to exercise (oxygen saturation less than 89%).The patient was exercised with supplemental oxygen as noted above. Clinical correlation suggested.     Patient met criteria for oxygen prescription.  [] Mild exercise-induced hypoxemia described as " an arterial oxygen saturation of 93-95% (or 3-4% less than at rest),  [] Moderate exercise-induced hypoxemia as 89-93%  [x] Severe exercise induced hypoxemia as < 89% O2 saturation.  Medicare Criteria for oxygen prescription comments:   When arterial oxygen saturation is at or below 88% during exercise on room air (severe exercise induced hypoxemia) then the patient falls under Medicare Group 1 criteria for supplemental oxygen prescription.  Details about Medicare Group Criteria coverage can be found at http://www.cms.[facility].gov/manuals/downloads/      Filemon Gonzalez MD        Assessment:       Multiple subsegmental pulmonary emboli without acute cor pulmonale  Finished therapy Eliquis - for over 6 months      JOSEFINA (obstructive sleep apnea)  Intolerant to Continuous Positive Airway Pressure - On oxygen     Multiple subsegmental pulmonary emboli without acute cor pulmonale  -     US Lower Extremity Veins Bilateral; Future; Expected date: 09/22/2023    Simple chronic bronchitis  -     OXYGEN FOR HOME USE  -     albuterol (PROVENTIL/VENTOLIN HFA) 90 mcg/actuation inhaler; Inhale 2 puffs into the lungs every 4 (four) hours as needed for Wheezing or Shortness of Breath.  Dispense: 18 g; Refill: 11  -     budesonide-glycopyr-formoterol (BREZTRI AEROSPHERE) 160-9-4.8 mcg/actuation HFAA; Inhale 2 Inhalations into the lungs 2 (two) times daily. Wash out mouth after use  Dispense: 10.7 g; Refill: 11    JOSEFINA (obstructive sleep apnea)    Bilateral lower extremity edema  -     US Lower Extremity Veins Bilateral; Future; Expected date: 09/22/2023    Localized edema  -     furosemide (LASIX) 20 MG tablet; Take 1 tablet (20 mg total) by mouth 2 (two) times a day. For fluid  Dispense: 60 tablet; Refill: 11    Class 3 severe obesity due to excess calories with serious comorbidity and body mass index (BMI) of 50.0 to 59.9 in adult    Chronic respiratory failure with hypoxia and hypercapnia  -     Six Minute Walk Test to qualify for Home  Oxygen; Future    Venous insufficiency of both lower extremities    Exercise hypoxemia  -     Six Minute Walk Test to qualify for Home Oxygen; Future          Outpatient Encounter Medications as of 9/22/2023   Medication Sig Dispense Refill    albuterol (PROVENTIL/VENTOLIN HFA) 90 mcg/actuation inhaler Inhale 2 puffs into the lungs every 4 (four) hours as needed for Wheezing or Shortness of Breath. 18 g 11    budesonide-glycopyr-formoterol (BREZTRI AEROSPHERE) 160-9-4.8 mcg/actuation HFAA Inhale 2 Inhalations into the lungs 2 (two) times daily. Wash out mouth after use 10.7 g 11    buprenorphine-naloxone 2-0.5 mg (SUBOXONE) 2-0.5 mg Subl Place under the tongue every 6 (six) hours as needed.       clonazePAM (KLONOPIN) 1 MG tablet Take 1 mg by mouth.      furosemide (LASIX) 20 MG tablet Take 1 tablet (20 mg total) by mouth 2 (two) times a day. For fluid 60 tablet 11    losartan (COZAAR) 50 MG tablet Take 50 mg by mouth.      rosuvastatin (CRESTOR) 10 MG tablet Take 10 mg by mouth once daily.      [DISCONTINUED] albuterol (PROVENTIL/VENTOLIN HFA) 90 mcg/actuation inhaler Inhale 2 puffs into the lungs every 4 (four) hours as needed for Wheezing or Shortness of Breath. 18 g 11    [DISCONTINUED] apixaban (ELIQUIS) 5 mg Tab Take 5 mg by mouth.      [DISCONTINUED] atorvastatin (LIPITOR) 40 MG tablet       [DISCONTINUED] furosemide (LASIX) 20 MG tablet Take 1 tablet (20 mg total) by mouth 2 (two) times a day. For fluid (Patient not taking: Reported on 9/22/2023) 60 tablet 11    [DISCONTINUED] tiotropium-olodateroL (STIOLTO RESPIMAT) 2.5-2.5 mcg/actuation Mist Inhale 2 puffs into the lungs once daily. Controller (Patient not taking: Reported on 9/22/2023) 4 g 11     No facility-administered encounter medications on file as of 9/22/2023.     Plan:       Requested Prescriptions     Signed Prescriptions Disp Refills    albuterol (PROVENTIL/VENTOLIN HFA) 90 mcg/actuation inhaler 18 g 11     Sig: Inhale 2 puffs into the lungs  every 4 (four) hours as needed for Wheezing or Shortness of Breath.    budesonide-glycopyr-formoterol (BREZTRI AEROSPHERE) 160-9-4.8 mcg/actuation HFAA 10.7 g 11     Sig: Inhale 2 Inhalations into the lungs 2 (two) times daily. Wash out mouth after use    furosemide (LASIX) 20 MG tablet 60 tablet 11     Sig: Take 1 tablet (20 mg total) by mouth 2 (two) times a day. For fluid     Problem List Items Addressed This Visit       Multiple subsegmental pulmonary emboli without acute cor pulmonale - Primary (Chronic)    Overview     Last Assessment & Plan:   Formatting of this note might be different from the original.  Unsure if this came from a lower extremity DVT.  She does have some immobility related to her back issues that could have provoked this episode.  She will require at least 6 months of anticoagulation.  If Dr. Pathak does decide to pursue her spine surgery in the future she may require IVC filter placement.  We will have her follow-up in October when she will be completing her 6 months of anticoagulation.         Current Assessment & Plan     Finished therapy Eliquis - for over 6 months           Relevant Orders    US Lower Extremity Veins Bilateral    JOSEFINA (obstructive sleep apnea) (Chronic)    Current Assessment & Plan     Intolerant to Continuous Positive Airway Pressure - On oxygen          Chronic respiratory failure with hypoxia and hypercapnia    Relevant Orders    Six Minute Walk Test to qualify for Home Oxygen    Class 3 severe obesity due to excess calories with serious comorbidity and body mass index (BMI) of 50.0 to 59.9 in adult    Venous insufficiency of both lower extremities     Other Visit Diagnoses       Simple chronic bronchitis        Relevant Medications    albuterol (PROVENTIL/VENTOLIN HFA) 90 mcg/actuation inhaler    budesonide-glycopyr-formoterol (BREZTRI AEROSPHERE) 160-9-4.8 mcg/actuation HFAA    Other Relevant Orders    OXYGEN FOR HOME USE    Bilateral lower extremity edema         Relevant Orders    US Lower Extremity Veins Bilateral    Localized edema        Relevant Medications    furosemide (LASIX) 20 MG tablet    Exercise hypoxemia        Relevant Orders    Six Minute Walk Test to qualify for Home Oxygen               Follow up in about 3 months (around 12/22/2023) for Review progress, 6 min walk - on return.    MEDICAL DECISION MAKING: Moderate to high complexity.  Overall, the multiple problems listed are of moderate to high severity that may impact quality of life and activities of daily living. Side effects of medications, treatment plan as well as options and alternatives reviewed and discussed with patient. There was counseling of patient concerning these issues.    Total time spent in counseling and coordination of care - 45  minutes of total time spent on the encounter, which includes face to face time and non-face to face time preparing to see the patient (eg, review of tests), Obtaining and/or reviewing separately obtained history, Documenting clinical information in the electronic or other health record, Independently interpreting results (not separately reported) and communicating results to the patient/family/caregiver, or Care coordination (not separately reported).    Time was used in discussion of prognosis, risks, benefits of treatment, instructions and compliance with regimen . Discussion with other physicians and/or health care providers - home health or for use of durable medical equipment (oxygen, nebulizers, CPAP, BiPAP) occurred.

## 2023-09-25 ENCOUNTER — TELEPHONE (OUTPATIENT)
Dept: PULMONOLOGY | Facility: CLINIC | Age: 58
End: 2023-09-25
Payer: COMMERCIAL

## 2023-09-25 DIAGNOSIS — J41.0 SIMPLE CHRONIC BRONCHITIS: ICD-10-CM

## 2023-09-25 NOTE — TELEPHONE ENCOUNTER
----- Message from Lavonne French sent at 9/25/2023  8:51 AM CDT -----  Pt states that she had a visit on the 22nd and she never received the medication that was prescribed,Please call back at [phone].Thanks

## 2023-09-26 RX ORDER — ALBUTEROL SULFATE 0.83 MG/ML
2.5 SOLUTION RESPIRATORY (INHALATION)
Qty: 360 ML | Refills: 11 | Status: SHIPPED | OUTPATIENT
Start: 2023-09-26 | End: 2024-09-25

## 2023-10-11 ENCOUNTER — HOSPITAL ENCOUNTER (OUTPATIENT)
Dept: RADIOLOGY | Facility: HOSPITAL | Age: 58
Discharge: HOME | End: 2023-10-11
Attending: INTERNAL MEDICINE
Payer: COMMERCIAL

## 2023-10-11 DIAGNOSIS — I26.94 MULTIPLE SUBSEGMENTAL PULMONARY EMBOLI WITHOUT ACUTE COR PULMONALE: Chronic | ICD-10-CM

## 2023-10-11 DIAGNOSIS — R60.0 BILATERAL LOWER EXTREMITY EDEMA: ICD-10-CM

## 2023-10-11 PROCEDURE — 93970 US LOWER EXTREMITY VEINS BILATERAL: ICD-10-PCS | Mod: 26,,, | Performed by: STUDENT IN AN ORGANIZED HEALTH CARE EDUCATION/TRAINING PROGRAM

## 2023-10-11 PROCEDURE — 93970 EXTREMITY STUDY: CPT | Mod: TC

## 2023-10-11 PROCEDURE — 93970 EXTREMITY STUDY: CPT | Mod: 26,,, | Performed by: STUDENT IN AN ORGANIZED HEALTH CARE EDUCATION/TRAINING PROGRAM

## 2023-10-12 ENCOUNTER — PATIENT MESSAGE (OUTPATIENT)
Dept: PULMONOLOGY | Facility: CLINIC | Age: 58
End: 2023-10-12
Payer: COMMERCIAL

## 2023-11-01 DIAGNOSIS — J41.0 SIMPLE CHRONIC BRONCHITIS: ICD-10-CM

## 2023-11-01 RX ORDER — BUDESONIDE, GLYCOPYRROLATE, AND FORMOTEROL FUMARATE 160; 9; 4.8 UG/1; UG/1; UG/1
2 AEROSOL, METERED RESPIRATORY (INHALATION) 2 TIMES DAILY
Qty: 10.7 G | Refills: 11 | Status: SHIPPED | OUTPATIENT
Start: 2023-11-01 | End: 2023-11-07 | Stop reason: SDUPTHER

## 2023-11-07 DIAGNOSIS — J41.0 SIMPLE CHRONIC BRONCHITIS: ICD-10-CM

## 2023-11-07 RX ORDER — BUDESONIDE, GLYCOPYRROLATE, AND FORMOTEROL FUMARATE 160; 9; 4.8 UG/1; UG/1; UG/1
2 AEROSOL, METERED RESPIRATORY (INHALATION) 2 TIMES DAILY
Qty: 10.7 G | Refills: 11 | Status: SHIPPED | OUTPATIENT
Start: 2023-11-07

## 2023-12-21 ENCOUNTER — TELEPHONE (OUTPATIENT)
Dept: PULMONOLOGY | Facility: CLINIC | Age: 58
End: 2023-12-21
Payer: COMMERCIAL

## 2024-04-03 ENCOUNTER — PATIENT MESSAGE (OUTPATIENT)
Dept: PULMONOLOGY | Facility: CLINIC | Age: 59
End: 2024-04-03
Payer: COMMERCIAL

## 2024-05-30 ENCOUNTER — TELEPHONE (OUTPATIENT)
Dept: PULMONOLOGY | Facility: CLINIC | Age: 59
End: 2024-05-30
Payer: COMMERCIAL

## 2024-05-30 NOTE — TELEPHONE ENCOUNTER
----- Message from Filemon Gonzalez MD sent at 5/29/2024  7:03 PM CDT -----  Regarding: reschedule  Contact: Emy  Heraclio handle  ----- Message -----  From: Bharath Lowry LPN  Sent: 5/29/2024  10:46 AM CDT  To: Filemon Gonzalez MD      ----- Message -----  From: Michelle Sorensen  Sent: 5/20/2024   9:42 AM CDT  To: [facility] Pulm Function [facility] Clinical Staff    Patient is calling to receive a call back at .,[phone]. Reports needing to reschedule canceled pulmonary walk test. Prefers thursday if available.

## 2024-06-05 ENCOUNTER — OFFICE VISIT (OUTPATIENT)
Dept: PULMONOLOGY | Facility: CLINIC | Age: 59
End: 2024-06-05
Payer: COMMERCIAL

## 2024-06-05 ENCOUNTER — CLINICAL SUPPORT (OUTPATIENT)
Dept: PULMONOLOGY | Facility: CLINIC | Age: 59
End: 2024-06-05
Payer: COMMERCIAL

## 2024-06-05 VITALS — BODY MASS INDEX: 48.82 KG/M2 | WEIGHT: 293 LBS | HEIGHT: 65 IN

## 2024-06-05 VITALS
BODY MASS INDEX: 48.82 KG/M2 | HEIGHT: 65 IN | DIASTOLIC BLOOD PRESSURE: 70 MMHG | WEIGHT: 293 LBS | HEART RATE: 72 BPM | OXYGEN SATURATION: 98 % | RESPIRATION RATE: 20 BRPM | SYSTOLIC BLOOD PRESSURE: 160 MMHG

## 2024-06-05 DIAGNOSIS — I26.94 MULTIPLE SUBSEGMENTAL PULMONARY EMBOLI WITHOUT ACUTE COR PULMONALE: Chronic | ICD-10-CM

## 2024-06-05 DIAGNOSIS — J96.11 CHRONIC RESPIRATORY FAILURE WITH HYPOXIA AND HYPERCAPNIA: Primary | ICD-10-CM

## 2024-06-05 DIAGNOSIS — J96.11 CHRONIC RESPIRATORY FAILURE WITH HYPOXIA AND HYPERCAPNIA: ICD-10-CM

## 2024-06-05 DIAGNOSIS — G47.33 OSA (OBSTRUCTIVE SLEEP APNEA): Chronic | ICD-10-CM

## 2024-06-05 DIAGNOSIS — E66.2 MORBID OBESITY WITH ALVEOLAR HYPOVENTILATION: ICD-10-CM

## 2024-06-05 DIAGNOSIS — J96.12 CHRONIC RESPIRATORY FAILURE WITH HYPOXIA AND HYPERCAPNIA: ICD-10-CM

## 2024-06-05 DIAGNOSIS — R09.02 EXERCISE HYPOXEMIA: ICD-10-CM

## 2024-06-05 DIAGNOSIS — J41.0 SIMPLE CHRONIC BRONCHITIS: ICD-10-CM

## 2024-06-05 DIAGNOSIS — E66.01 MORBID OBESITY WITH BMI OF 50.0-59.9, ADULT: ICD-10-CM

## 2024-06-05 DIAGNOSIS — J96.12 CHRONIC RESPIRATORY FAILURE WITH HYPOXIA AND HYPERCAPNIA: Primary | ICD-10-CM

## 2024-06-05 PROCEDURE — 3078F DIAST BP <80 MM HG: CPT | Mod: CPTII,S$GLB,, | Performed by: NURSE PRACTITIONER

## 2024-06-05 PROCEDURE — 1159F MED LIST DOCD IN RCRD: CPT | Mod: CPTII,S$GLB,, | Performed by: NURSE PRACTITIONER

## 2024-06-05 PROCEDURE — 99214 OFFICE O/P EST MOD 30 MIN: CPT | Mod: 25,S$GLB,, | Performed by: NURSE PRACTITIONER

## 2024-06-05 PROCEDURE — 94618 PULMONARY STRESS TESTING: CPT | Mod: S$GLB,,, | Performed by: INTERNAL MEDICINE

## 2024-06-05 PROCEDURE — 1160F RVW MEDS BY RX/DR IN RCRD: CPT | Mod: CPTII,S$GLB,, | Performed by: NURSE PRACTITIONER

## 2024-06-05 PROCEDURE — 99999 PR PBB SHADOW E&M-EST. PATIENT-LVL I: CPT | Mod: PBBFAC,,,

## 2024-06-05 PROCEDURE — 3077F SYST BP >= 140 MM HG: CPT | Mod: CPTII,S$GLB,, | Performed by: NURSE PRACTITIONER

## 2024-06-05 PROCEDURE — 4010F ACE/ARB THERAPY RXD/TAKEN: CPT | Mod: CPTII,S$GLB,, | Performed by: NURSE PRACTITIONER

## 2024-06-05 PROCEDURE — 3008F BODY MASS INDEX DOCD: CPT | Mod: CPTII,S$GLB,, | Performed by: NURSE PRACTITIONER

## 2024-06-05 PROCEDURE — 99999 PR PBB SHADOW E&M-EST. PATIENT-LVL IV: CPT | Mod: PBBFAC,,, | Performed by: NURSE PRACTITIONER

## 2024-06-05 RX ORDER — SEMAGLUTIDE 1.34 MG/ML
1 INJECTION, SOLUTION SUBCUTANEOUS
COMMUNITY
Start: 2024-05-20

## 2024-06-05 RX ORDER — BUPRENORPHINE AND NALOXONE 8; 2 MG/1; MG/1
FILM, SOLUBLE BUCCAL; SUBLINGUAL
COMMUNITY
Start: 2024-06-01

## 2024-06-05 RX ORDER — FLUTICASONE PROPIONATE 50 MCG
1 SPRAY, SUSPENSION (ML) NASAL
COMMUNITY
Start: 2024-05-13 | End: 2025-05-13

## 2024-06-05 NOTE — PROGRESS NOTES
Subjective:      Patient ID: Emy Ayala is a 58 y.o. female.    Chief Complaint: Sleep Apnea and COPD    HPI: Emy Ayala presents to clinic for follow up for JOSEFINA and chronic respiratory failure, with review 6MWD, she would like consideration for battery operated portable.   6/5/2024 6MWD Desaturations requiring supplemental oxygen NC 6 L with activity.   Patient needs to continue portable tanks related to need for 6L continuous flow.   Patient is currently using NC 3 L for sleep since 12/2021 when sent home on oxygen.   HME: [facility] for home oxygen    [place] Sleepiness Scale       6/5/2024    12:00 PM 9/22/2023     1:28 PM 3/7/2022     3:00 PM 12/29/2021     2:00 PM   EPWORTH SLEEPINESS SCALE   Sitting and reading 3 0 3 3   Watching TV 3 0 3 3   Sitting, inactive in a public place (e.g. a theatre or a meeting) 3 0 0 0   As a passenger in a car for an hour without a break 0 3 0 0   Lying down to rest in the afternoon when circumstances permit 3 3 3 3   Sitting and talking to someone 0 0 0 0   Sitting quietly after a lunch without alcohol 0 0 0 0   In a car, while stopped for a few minutes in traffic 0 0 0 0   Total score 12 6 9 9       Sleep Apnea  Patient has been observed snoring with apnea.   Patient reports non restful' sleep.  She denies morning headache.   She reports day time napping.  Day time napping duration 1 Hour  She denies recent weight gain.  Cardiovascular risk factors: hypertension and obesity  Bed time is 0900  Wake time is 0500  Sleep onset is within  15 Minutes.  Sleep maintenance difficulties related to frequent night time awakening  Wake after sleep onset occurs four times a night.  Nocturia occurs four times a night,   Sleep aids : Yes, Klonopin 1 mg  Dry mouth : Yes, sometimes  Sleep walking: No  Sleep talking : No  Sleep eating:No  Vivid Dreams : No  Cataplexy : No    [place] Sleepiness Scale       6/5/2024    12:00 PM 9/22/2023     1:28 PM 3/7/2022     3:00 PM  12/29/2021     2:00 PM   EPWORTH SLEEPINESS SCALE   Sitting and reading 3 0 3 3   Watching TV 3 0 3 3   Sitting, inactive in a public place (e.g. a theatre or a meeting) 3 0 0 0   As a passenger in a car for an hour without a break 0 3 0 0   Lying down to rest in the afternoon when circumstances permit 3 3 3 3   Sitting and talking to someone 0 0 0 0   Sitting quietly after a lunch without alcohol 0 0 0 0   In a car, while stopped for a few minutes in traffic 0 0 0 0   Total score 12 6 9 9       Neck circumference is 20.  Mallampati score 3    STOP - BANG Questionnaire:   1. Snoring : Do you snore loudly ?     YES  2. Tired : Do you often feel tired, fatigued, or sleepy during daytime?   YES  3. Observed: Has anyone observed you stop breathing during your sleep?     YES  4. Blood pressure : Do you have or are you being treated for high blood pressure?    YES  5. BMI :BMI more than 35 kg/m2?    YES  6. Age : Age over 50 yr old?    YES  7. Neck circumference: Neck circumference greater than 40 cm?    YES  8. Gender: Gender male?   NO    SCORE: 7    High risk of JOSEFINA: Yes 5 - 8  Intermediate risk of JOSEFINA: Yes 3 - 4  Low risk of JOSEFINA: Yes 0 - 2    Simple chronic Bronchitis  Stable on Stiolto, Albuterol inhaler. Albuterol nebs   No cough, no mucous, chronic shortness of breath exertion. Benefits and adherent to NC 3 L continuous.     Previous Report Reviewed: lab reports and office notes     Past Medical History: The following portions of the patient's history were reviewed and updated as appropriate:   She  has no past surgical history on file.  Her family history is not on file.  She  reports that she quit smoking about 3 years ago. Her smoking use included vaping with nicotine and cigarettes. She started smoking about 45 years ago. She has a 41.9 pack-year smoking history. She has never used smokeless tobacco. She reports that she does not drink alcohol and does not use drugs.  She has a current medication list which  "includes the following prescription(s): buprenorphine-naloxone 8-2 mg, fluticasone propionate, ozempic, albuterol, albuterol, breztri aerosphere, buprenorphine-naloxone 2-0.5 mg, clonazepam, furosemide, losartan, and rosuvastatin.  She has No Known Allergies..    The following portions of the patient's history were reviewed and updated as appropriate: allergies, current medications, past family history, past medical history, past social history, past surgical history and problem list.    Review of Systems   Constitutional:  Negative for fever, chills, weight loss, weight gain, activity change, appetite change, fatigue and night sweats.   HENT:  Negative for postnasal drip, rhinorrhea, sinus pressure, voice change and congestion.    Eyes:  Negative for redness and itching.   Respiratory:  Positive for apnea and snoring. Negative for cough, sputum production, chest tightness, shortness of breath, wheezing, orthopnea, asthma nighttime symptoms, dyspnea on extertion, use of rescue inhaler and somnolence.    Cardiovascular: Negative.  Negative for chest pain, palpitations and leg swelling.   Genitourinary:  Negative for difficulty urinating and hematuria.   Endocrine:  Negative for cold intolerance and heat intolerance.    Musculoskeletal:  Negative for arthralgias, gait problem, joint swelling and myalgias.   Skin: Negative.    Gastrointestinal:  Negative for nausea, vomiting, abdominal pain and acid reflux.   Neurological:  Negative for dizziness, weakness, light-headedness and headaches.   Hematological:  Negative for adenopathy. No excessive bruising.   All other systems reviewed and are negative.     Objective:   BP (!) 160/70   Pulse 72   Resp 20   Ht 5' 5" (1.651 m)   Wt (!) 156.5 kg (345 lb)   SpO2 98% Comment: 2 liters at night  BMI 57.41 kg/m²   Physical Exam  Vitals and nursing note reviewed.   Constitutional:       General: She is awake. She is not in acute distress.     Appearance: Normal appearance. " She is well-developed and well-groomed. She is morbidly obese. She is not ill-appearing or toxic-appearing.   HENT:      Head: Normocephalic.      Right Ear: External ear normal.      Left Ear: External ear normal.      Nose: Nose normal.      Mouth/Throat:      Pharynx: No oropharyngeal exudate.   Eyes:      Conjunctiva/sclera: Conjunctivae normal.   Cardiovascular:      Rate and Rhythm: Normal rate and regular rhythm.      Heart sounds: Normal heart sounds.   Pulmonary:      Effort: Pulmonary effort is normal.      Breath sounds: Normal breath sounds. No stridor.   Abdominal:      Palpations: Abdomen is soft.   Musculoskeletal:         General: Normal range of motion.      Cervical back: Normal range of motion and neck supple.   Lymphadenopathy:      Cervical: No cervical adenopathy.   Skin:     General: Skin is warm and dry.   Neurological:      Mental Status: She is alert and oriented to person, place, and time.   Psychiatric:         Behavior: Behavior normal. Behavior is cooperative.         Thought Content: Thought content normal.         Judgment: Judgment normal.         Personal Diagnostic Review  CPAP download    Phase Oxygen Assessment Supplemental O2 Heart   Rate Blood Pressure Nely Dyspnea Scale Rating   Resting 85 % (91% when placed on 2L) Room Air (increased to 2L) 104 bpm 165/84 3   Exercise             Minute             1 88 % (90% when increased to 3L) 2 L/M (increased to 3L) 115 bpm       2 87 % (90% when increased to 4l) 3 L/M (Increased to 4L) 120 bpm       3 90 % 4 L/M 115 bpm       4 92 % 4 L/M 124 bpm       5 91 % 4 L/M 127 bpm       6  90 % 4 L/M 124 bpm 147/70 4   Recovery             Minute             1 97 % 4 L/M 115 bpm       2 95 % 4 L/M 107 bpm       3 98 % 4 L/M 104 bpm       4 96 % 4 L/M 107 bpm 137/60 2      Six Minute Walk Summary  6MWT Status: completed without stopping  Patient Reported: Dyspnea         Interpretation:  Did the patient stop or pause?: No  Total Time Walked  (Calculated): 360 seconds  Final Partial Lap Distance (feet): 0 feet  Total Distance Meters (Calculated): 121.92 meters  Predicted Distance Meters (Calculated): 345.92 meters  Percentage of Predicted (Calculated): 35.25  Peak VO2 (Calculated): 7.64  Mets: 2.18  Has The Patient Had a Previous Six Minute Walk Test?: No     Previous 6MWT Results  Has The Patient Had a Previous Six Minute Walk Test?: No         Last Resulted: 03/07/22           Assessment:     1. Chronic respiratory failure with hypoxia and hypercapnia    2. Multiple subsegmental pulmonary emboli without acute cor pulmonale    3. Simple chronic bronchitis    4. Morbid obesity with alveolar hypoventilation    5. Morbid obesity with BMI of 50.0-59.9, adult    6. JOSEFINA (obstructive sleep apnea)        Orders Placed This Encounter   Procedures    OXYGEN FOR HOME USE     Patient is on home O2, re-certification.   HME: [facility]     Order Specific Question:   Liter Flow     Answer:   6     Order Specific Question:   Duration     Answer:   With activity     Order Specific Question:   Qualifying Test Performed at:     Answer:   Activity     Comments:   6/5/2024 6MWD     Order Specific Question:   Oxygen saturation at rest     Answer:   90     Order Specific Question:   Oxygen saturation with activity     Answer:   82     Order Specific Question:   Oxygen saturation with activity on oxygen     Answer:   90     Order Specific Question:   Portable mode:     Answer:   continuous     Order Specific Question:   Route     Answer:   nasal cannula     Order Specific Question:   Device:     Answer:   home concentrator with portable tanks     Order Specific Question:   Length of need (in months):     Answer:   99 mos     Order Specific Question:   Patient condition with qualifying saturation     Answer:   Other - List qualifying diagnosis and code     Order Specific Question:   Select a diagnosis & list the code in the comments     Answer:   COPD (chronic obstructive  "pulmonary disease) [471402]     Order Specific Question:   Select a diagnosis & list the code in the comments     Answer:   Chronic respiratory failure with hypoxia and hypercapnia [2867651]     Order Specific Question:   Select a diagnosis & list the code in the comments     Answer:   Chronic respiratory failure with hypoxia, on home oxygen therapy [6487967]     Order Specific Question:   Height:     Answer:   5' 5" (1.651 m)     Order Specific Question:   Weight:     Answer:   156.5 kg (345 lb)     Order Specific Question:   Alternative treatment measures have been tried or considered and deemed clinically ineffective.     Answer:   Yes    X-Ray Chest PA And Lateral     Standing Status:   Future     Standing Expiration Date:   6/5/2025     Order Specific Question:   May the Radiologist modify the order per protocol to meet the clinical needs of the patient?     Answer:   Yes     Order Specific Question:   Release to patient     Answer:   Immediate    PFT - related Arterial Blood Gas     Standing Status:   Future     Standing Expiration Date:   6/5/2025     Order Specific Question:   Release to patient     Answer:   Immediate    Complete PFT with bronchodilator     Standing Status:   Future     Standing Expiration Date:   6/5/2025     Order Specific Question:   Release to patient     Answer:   Immediate     Plan:     Problem List Items Addressed This Visit       JOSEFINA (obstructive sleep apnea) (Chronic)     Intolerant to wearing CPAP mask.  On NC 3L sleep          Relevant Orders    PFT - related Arterial Blood Gas    OXYGEN FOR HOME USE    Multiple subsegmental pulmonary emboli without acute cor pulmonale (Chronic)    Relevant Orders    Complete PFT with bronchodilator    OXYGEN FOR HOME USE    Chronic respiratory failure with hypoxia and hypercapnia - Primary    Relevant Orders    PFT - related Arterial Blood Gas    OXYGEN FOR HOME USE     Other Visit Diagnoses       Simple chronic bronchitis        Relevant Orders "    Complete PFT with bronchodilator    X-Ray Chest PA And Lateral    OXYGEN FOR HOME USE    Morbid obesity with alveolar hypoventilation        Relevant Orders    PFT - related Arterial Blood Gas    OXYGEN FOR HOME USE    Morbid obesity with BMI of 50.0-59.9, adult        Relevant Orders    OXYGEN FOR HOME USE          Patient has been intolerant to CPAP since diagnosed with [facility]  Proceed with PSG with Bipap titration study  ABG on room air this week, may need consideration for Trilogy vent.   History of COPD chronic bronchitis type controlled on Stiolto   Continue Stiolto, albuterol inhaler, albuterol nebs   Continue home O2 6 L continuous with portable tanks.     Ken Patel (DME)  Reviewed therapeutic goals for positive airway pressure therapy.  Ideal is usage 100% of nights for 6 - 8 hours per night. Minimum usage is 70% of night for at least 4 hours per night used.     Follow up in about 3 months (around 9/5/2024) for Simple chronic bronchitis/hypoventilation on O2. cpft/abg/cxr.

## 2024-06-05 NOTE — PROCEDURES
"[facility] - Pulmonary Function  Six Minute Walk     SUMMARY     Ordering Provider: Dr Gonzalez   Interpreting Provider: Dr. Gonzalez  Performing nurse/tech/RT: CC RRT  Diagnosis:  (chronic RF with H&H)  Height: 5' 5" (165.1 cm)  Weight: (!) 156.8 kg (345 lb 10.9 oz)  BMI (Calculated): 57.5   Patient Race:             Phase Oxygen Assessment Supplemental O2 Heart   Rate Blood Pressure Nely Dyspnea Scale Rating   Resting 90 % Room Air 74 bpm 160/70 3   Exercise        Minute        1 82 % Room Air 112 bpm     2 81 % 2 L/M 118 bpm     3 87 % 3 L/M 116 bpm     4 91 % 4 L/M 118 bpm     5 88 % 4 L/M 119 bpm     6  90 % 6 L/M 123 bpm (!) 193/91 4   Recovery        Minute        1 94 % 6 L/M 109 bpm     2 98 % 6 L/M 95 bpm     3 98 % 6 L/M 92 bpm     4 98 % 6 L/M 89 bpm 173/79 3     Six Minute Walk Summary     Patient Reported: Dyspnea (back and leg pain)     Interpretation:  Did the patient stop or pause?: No                                         Total Time Walked (Calculated): 360 seconds  Final Partial Lap Distance (feet): 100 feet  Total Distance Meters (Calculated): 213.36 meters  Predicted Distance Meters (Calculated): 332.61 meters  Percentage of Predicted (Calculated): 64.15  Peak VO2 (Calculated): 10.38  Mets: 2.97  Has The Patient Had a Previous Six Minute Walk Test?: Yes       Previous 6MWT Results  Has The Patient Had a Previous Six Minute Walk Test?: Yes  Date of Previous Test: 03/07/22  Total Time Walked: 360 seconds  Total Distance (meters): 121.92  Predicted Distance (meters): 345.92 meters  Percentage of Predicted: 35.25  Percent Change (Calculated): -0.75    Interpretation:  Total distance walked in six minutes is mildly reduced indicating an mild reduction in functional capacity.  There was significant severe oxygen desaturation to 81 % with exercise on room air (oxygen saturation less than 89%). Supplemental oxygen was administered for the remainder of the test as noted above. Clinical correlation " suggested.  Patient met criteria for oxygen prescription.  [] Mild exercise-induced hypoxemia described as an arterial oxygen saturation of 93-95% (with a fall of 3-4% with exercise),   [] Moderate exercise-induced hypoxemia as a fall in oxygen saturation to  89-93% (with a fall of 3-4 % with exercise)  [x] Severe exercise induced hypoxemia as < 89% O2 saturation (88% and below).  Medicare Criteria for Oxygen prescription comments: When arterial oxygen saturation is at or below 88% during exercise (severe exercise induced hypoxemia) then the patient meets criteria for oxygen prescription.  Details about Medicare Group Criteria coverage can be found at http://www.cms.[facility].gov/manuals/downloads/     Filemon Gonzalez MD

## 2024-09-16 ENCOUNTER — TELEPHONE (OUTPATIENT)
Dept: PULMONOLOGY | Facility: CLINIC | Age: 59
End: 2024-09-16
Payer: COMMERCIAL

## 2024-09-16 NOTE — TELEPHONE ENCOUNTER
----- Message from Nya Fulton LPN sent at 9/16/2024  8:25 AM CDT -----  Contact: self     ----- Message -----  From: Rowena Peace  Sent: 9/16/2024   7:12 AM CDT  To: [facility] Clinical Staff    .Type: Patient Call Back        Who called:      Patient   What is the request in detail:    Called in concerning appts for pulmonary lab today . Please call back   Can the clinic reply by [facility]?           Would the patient rather a call back or a response via [facility]?   call      Best call back number:  [phone]

## 2025-08-25 ENCOUNTER — TELEPHONE (OUTPATIENT)
Dept: PULMONOLOGY | Facility: CLINIC | Age: 60
End: 2025-08-25
Payer: COMMERCIAL

## 2025-08-25 DIAGNOSIS — J96.12 CHRONIC RESPIRATORY FAILURE WITH HYPOXIA AND HYPERCAPNIA: Primary | ICD-10-CM

## 2025-08-25 DIAGNOSIS — J96.11 CHRONIC RESPIRATORY FAILURE WITH HYPOXIA AND HYPERCAPNIA: Primary | ICD-10-CM

## 2025-08-28 ENCOUNTER — CLINICAL SUPPORT (OUTPATIENT)
Dept: PULMONOLOGY | Facility: CLINIC | Age: 60
End: 2025-08-28
Payer: COMMERCIAL

## 2025-08-28 ENCOUNTER — OFFICE VISIT (OUTPATIENT)
Dept: PULMONOLOGY | Facility: CLINIC | Age: 60
End: 2025-08-28
Payer: COMMERCIAL

## 2025-08-28 VITALS
SYSTOLIC BLOOD PRESSURE: 176 MMHG | RESPIRATION RATE: 21 BRPM | OXYGEN SATURATION: 92 % | DIASTOLIC BLOOD PRESSURE: 78 MMHG | HEIGHT: 65 IN | WEIGHT: 293 LBS | HEART RATE: 87 BPM | WEIGHT: 293 LBS | BODY MASS INDEX: 48.82 KG/M2 | BODY MASS INDEX: 48.82 KG/M2 | HEIGHT: 65 IN

## 2025-08-28 DIAGNOSIS — J96.11 CHRONIC RESPIRATORY FAILURE WITH HYPOXIA AND HYPERCAPNIA: ICD-10-CM

## 2025-08-28 DIAGNOSIS — J98.4 RESTRICTIVE LUNG DISEASE: ICD-10-CM

## 2025-08-28 DIAGNOSIS — J96.11 CHRONIC RESPIRATORY FAILURE WITH HYPOXIA AND HYPERCAPNIA: Primary | ICD-10-CM

## 2025-08-28 DIAGNOSIS — J96.12 CHRONIC RESPIRATORY FAILURE WITH HYPOXIA AND HYPERCAPNIA: ICD-10-CM

## 2025-08-28 DIAGNOSIS — J96.12 CHRONIC RESPIRATORY FAILURE WITH HYPOXIA AND HYPERCAPNIA: Primary | ICD-10-CM

## 2025-08-28 PROCEDURE — 3008F BODY MASS INDEX DOCD: CPT | Mod: CPTII,S$GLB,, | Performed by: STUDENT IN AN ORGANIZED HEALTH CARE EDUCATION/TRAINING PROGRAM

## 2025-08-28 PROCEDURE — 99999 PR PBB SHADOW E&M-EST. PATIENT-LVL I: CPT | Mod: PBBFAC,,,

## 2025-08-28 PROCEDURE — 99214 OFFICE O/P EST MOD 30 MIN: CPT | Mod: S$GLB,,, | Performed by: STUDENT IN AN ORGANIZED HEALTH CARE EDUCATION/TRAINING PROGRAM

## 2025-08-28 PROCEDURE — 3078F DIAST BP <80 MM HG: CPT | Mod: CPTII,S$GLB,, | Performed by: STUDENT IN AN ORGANIZED HEALTH CARE EDUCATION/TRAINING PROGRAM

## 2025-08-28 PROCEDURE — 99999 PR PBB SHADOW E&M-EST. PATIENT-LVL IV: CPT | Mod: PBBFAC,,, | Performed by: STUDENT IN AN ORGANIZED HEALTH CARE EDUCATION/TRAINING PROGRAM

## 2025-08-28 PROCEDURE — 1159F MED LIST DOCD IN RCRD: CPT | Mod: CPTII,S$GLB,, | Performed by: STUDENT IN AN ORGANIZED HEALTH CARE EDUCATION/TRAINING PROGRAM

## 2025-08-28 PROCEDURE — 4010F ACE/ARB THERAPY RXD/TAKEN: CPT | Mod: CPTII,S$GLB,, | Performed by: STUDENT IN AN ORGANIZED HEALTH CARE EDUCATION/TRAINING PROGRAM

## 2025-08-28 PROCEDURE — 3077F SYST BP >= 140 MM HG: CPT | Mod: CPTII,S$GLB,, | Performed by: STUDENT IN AN ORGANIZED HEALTH CARE EDUCATION/TRAINING PROGRAM

## 2025-08-28 RX ORDER — BUDESONIDE AND FORMOTEROL FUMARATE DIHYDRATE 160; 4.5 UG/1; UG/1
2 AEROSOL RESPIRATORY (INHALATION) EVERY 12 HOURS
Qty: 1 G | Refills: 3 | Status: SHIPPED | OUTPATIENT
Start: 2025-08-28 | End: 2026-08-28

## 2025-08-28 RX ORDER — TIRZEPATIDE 7.5 MG/.5ML
7.5 INJECTION, SOLUTION SUBCUTANEOUS
COMMUNITY
Start: 2025-08-28 | End: 2025-11-26